# Patient Record
Sex: MALE | Race: WHITE | Employment: UNEMPLOYED | URBAN - METROPOLITAN AREA
[De-identification: names, ages, dates, MRNs, and addresses within clinical notes are randomized per-mention and may not be internally consistent; named-entity substitution may affect disease eponyms.]

---

## 2017-02-14 ENCOUNTER — OFFICE VISIT (OUTPATIENT)
Dept: FAMILY MEDICINE CLINIC | Age: 32
End: 2017-02-14

## 2017-02-14 VITALS
WEIGHT: 315 LBS | RESPIRATION RATE: 12 BRPM | TEMPERATURE: 98.2 F | DIASTOLIC BLOOD PRESSURE: 76 MMHG | SYSTOLIC BLOOD PRESSURE: 134 MMHG | BODY MASS INDEX: 44.1 KG/M2 | HEIGHT: 71 IN | HEART RATE: 85 BPM

## 2017-02-14 DIAGNOSIS — R53.82 CHRONIC FATIGUE: Primary | ICD-10-CM

## 2017-02-14 DIAGNOSIS — G47.10 EXCESSIVE SLEEPINESS: ICD-10-CM

## 2017-02-14 DIAGNOSIS — F33.41 RECURRENT MAJOR DEPRESSIVE DISORDER, IN PARTIAL REMISSION (HCC): ICD-10-CM

## 2017-02-14 DIAGNOSIS — Z23 NEED FOR PROPHYLACTIC VACCINATION AND INOCULATION AGAINST INFLUENZA: ICD-10-CM

## 2017-02-14 DIAGNOSIS — R79.89 LOW VITAMIN D LEVEL: ICD-10-CM

## 2017-02-14 DIAGNOSIS — F41.9 ANXIETY: ICD-10-CM

## 2017-02-14 PROCEDURE — 90688 IIV4 VACCINE SPLT 0.5 ML IM: CPT | Performed by: NURSE PRACTITIONER

## 2017-02-14 PROCEDURE — 90471 IMMUNIZATION ADMIN: CPT | Performed by: NURSE PRACTITIONER

## 2017-02-14 PROCEDURE — 99214 OFFICE O/P EST MOD 30 MIN: CPT | Performed by: NURSE PRACTITIONER

## 2017-02-14 RX ORDER — LAMOTRIGINE 200 MG/1
1 TABLET ORAL DAILY
COMMUNITY
Start: 2017-02-08 | End: 2017-11-24 | Stop reason: SDUPTHER

## 2017-02-14 ASSESSMENT — ENCOUNTER SYMPTOMS
ABDOMINAL PAIN: 0
PHOTOPHOBIA: 0
CONSTIPATION: 0
SORE THROAT: 0
ABDOMINAL DISTENTION: 0
BLOOD IN STOOL: 0
EYE PAIN: 0
DIARRHEA: 0
EYE DISCHARGE: 0
NAUSEA: 0
TROUBLE SWALLOWING: 0
COUGH: 0
SHORTNESS OF BREATH: 0
VOMITING: 0

## 2017-02-24 LAB
CHOLESTEROL, TOTAL: 210 MG/DL
CHOLESTEROL/HDL RATIO: NORMAL
HDLC SERPL-MCNC: 37 MG/DL (ref 35–70)
LDL CHOLESTEROL CALCULATED: 149 MG/DL (ref 0–160)
TRIGL SERPL-MCNC: 119 MG/DL
VLDLC SERPL CALC-MCNC: 24 MG/DL

## 2017-03-16 ENCOUNTER — TELEPHONE (OUTPATIENT)
Dept: FAMILY MEDICINE CLINIC | Age: 32
End: 2017-03-16

## 2017-03-17 ENCOUNTER — OFFICE VISIT (OUTPATIENT)
Dept: FAMILY MEDICINE CLINIC | Age: 32
End: 2017-03-17

## 2017-03-17 VITALS
HEART RATE: 81 BPM | RESPIRATION RATE: 12 BRPM | HEIGHT: 71 IN | BODY MASS INDEX: 44.1 KG/M2 | TEMPERATURE: 97.5 F | DIASTOLIC BLOOD PRESSURE: 81 MMHG | SYSTOLIC BLOOD PRESSURE: 142 MMHG | WEIGHT: 315 LBS

## 2017-03-17 DIAGNOSIS — E55.9 VITAMIN D DEFICIENCY: ICD-10-CM

## 2017-03-17 DIAGNOSIS — E78.2 MIXED HYPERLIPIDEMIA: Primary | ICD-10-CM

## 2017-03-17 DIAGNOSIS — E78.6 LOW HDL (UNDER 40): ICD-10-CM

## 2017-03-17 PROCEDURE — 99214 OFFICE O/P EST MOD 30 MIN: CPT | Performed by: NURSE PRACTITIONER

## 2017-03-17 RX ORDER — OMEGA-3S/DHA/EPA/FISH OIL/D3 300MG-1000
1 CAPSULE ORAL
Qty: 90 CAPSULE | Refills: 5 | Status: SHIPPED | OUTPATIENT
Start: 2017-03-17 | End: 2017-07-28 | Stop reason: SDUPTHER

## 2017-03-17 RX ORDER — CHOLECALCIFEROL (VITAMIN D3) 50 MCG
2000 TABLET ORAL DAILY
Qty: 30 TABLET | Refills: 5 | Status: SHIPPED | OUTPATIENT
Start: 2017-03-17 | End: 2017-07-28 | Stop reason: SDUPTHER

## 2017-04-17 PROBLEM — E66.01 MORBID OBESITY DUE TO EXCESS CALORIES (HCC): Status: ACTIVE | Noted: 2017-04-17

## 2017-07-28 ENCOUNTER — OFFICE VISIT (OUTPATIENT)
Dept: FAMILY MEDICINE CLINIC | Age: 32
End: 2017-07-28
Payer: COMMERCIAL

## 2017-07-28 VITALS
RESPIRATION RATE: 14 BRPM | WEIGHT: 315 LBS | DIASTOLIC BLOOD PRESSURE: 80 MMHG | HEART RATE: 100 BPM | BODY MASS INDEX: 42.66 KG/M2 | HEIGHT: 72 IN | TEMPERATURE: 99 F | SYSTOLIC BLOOD PRESSURE: 136 MMHG

## 2017-07-28 DIAGNOSIS — R53.83 OTHER FATIGUE: Primary | ICD-10-CM

## 2017-07-28 DIAGNOSIS — E55.9 VITAMIN D DEFICIENCY: ICD-10-CM

## 2017-07-28 DIAGNOSIS — R40.0 DAYTIME SLEEPINESS: ICD-10-CM

## 2017-07-28 DIAGNOSIS — E66.01 OBESITY, MORBID, BMI 50 OR HIGHER (HCC): ICD-10-CM

## 2017-07-28 DIAGNOSIS — R06.83 SNORING: ICD-10-CM

## 2017-07-28 DIAGNOSIS — E78.6 LOW HDL (UNDER 40): ICD-10-CM

## 2017-07-28 PROCEDURE — 99214 OFFICE O/P EST MOD 30 MIN: CPT | Performed by: FAMILY MEDICINE

## 2017-07-28 RX ORDER — CHOLECALCIFEROL (VITAMIN D3) 50 MCG
4000 TABLET ORAL DAILY
Qty: 60 TABLET | Refills: 5 | Status: SHIPPED | OUTPATIENT
Start: 2017-07-28 | End: 2021-02-08 | Stop reason: ALTCHOICE

## 2017-07-28 RX ORDER — OMEGA-3S/DHA/EPA/FISH OIL/D3 300MG-1000
2 CAPSULE ORAL 2 TIMES DAILY
Qty: 120 CAPSULE | Refills: 5 | Status: SHIPPED | OUTPATIENT
Start: 2017-07-28 | End: 2021-02-08 | Stop reason: ALTCHOICE

## 2017-07-28 ASSESSMENT — PATIENT HEALTH QUESTIONNAIRE - PHQ9
SUM OF ALL RESPONSES TO PHQ QUESTIONS 1-9: 0
2. FEELING DOWN, DEPRESSED OR HOPELESS: 0
1. LITTLE INTEREST OR PLEASURE IN DOING THINGS: 0
SUM OF ALL RESPONSES TO PHQ9 QUESTIONS 1 & 2: 0

## 2017-09-05 ENCOUNTER — INITIAL CONSULT (OUTPATIENT)
Dept: PULMONOLOGY | Age: 32
End: 2017-09-05
Payer: COMMERCIAL

## 2017-09-05 VITALS
BODY MASS INDEX: 44.1 KG/M2 | SYSTOLIC BLOOD PRESSURE: 128 MMHG | HEIGHT: 71 IN | WEIGHT: 315 LBS | DIASTOLIC BLOOD PRESSURE: 72 MMHG | HEART RATE: 90 BPM | OXYGEN SATURATION: 98 %

## 2017-09-05 DIAGNOSIS — R06.83 SNORING: ICD-10-CM

## 2017-09-05 DIAGNOSIS — R06.89 SLEEP RELATED CHOKING SENSATION: ICD-10-CM

## 2017-09-05 DIAGNOSIS — G47.10 HYPERSOMNIA: Primary | ICD-10-CM

## 2017-09-05 DIAGNOSIS — F31.81 BIPOLAR 2 DISORDER (HCC): ICD-10-CM

## 2017-09-05 DIAGNOSIS — E66.01 MORBID OBESITY WITH BMI OF 50.0-59.9, ADULT (HCC): ICD-10-CM

## 2017-09-05 PROBLEM — R53.83 FATIGUE: Status: ACTIVE | Noted: 2017-09-05

## 2017-09-05 PROBLEM — R40.0 DAYTIME SLEEPINESS: Status: ACTIVE | Noted: 2017-09-05

## 2017-09-05 PROCEDURE — 99203 OFFICE O/P NEW LOW 30 MIN: CPT | Performed by: INTERNAL MEDICINE

## 2017-09-12 ENCOUNTER — HOSPITAL ENCOUNTER (OUTPATIENT)
Dept: SLEEP CENTER | Age: 32
Discharge: HOME | End: 2017-09-12
Payer: COMMERCIAL

## 2017-09-12 DIAGNOSIS — F31.81 BIPOLAR 2 DISORDER (HCC): ICD-10-CM

## 2017-09-12 DIAGNOSIS — R06.83 SNORING: ICD-10-CM

## 2017-09-12 DIAGNOSIS — R06.89 SLEEP RELATED CHOKING SENSATION: ICD-10-CM

## 2017-09-12 DIAGNOSIS — G47.10 HYPERSOMNIA: ICD-10-CM

## 2017-09-12 DIAGNOSIS — E66.01 MORBID OBESITY WITH BMI OF 50.0-59.9, ADULT (HCC): ICD-10-CM

## 2017-09-12 PROCEDURE — 95810 POLYSOM 6/> YRS 4/> PARAM: CPT

## 2017-09-13 LAB — STATUS: NORMAL

## 2017-09-15 DIAGNOSIS — G47.33 OSA (OBSTRUCTIVE SLEEP APNEA): Primary | ICD-10-CM

## 2017-09-18 ENCOUNTER — TELEPHONE (OUTPATIENT)
Dept: SLEEP CENTER | Age: 32
End: 2017-09-18

## 2017-10-27 ENCOUNTER — TELEPHONE (OUTPATIENT)
Dept: SLEEP CENTER | Age: 32
End: 2017-10-27

## 2017-10-27 ENCOUNTER — OFFICE VISIT (OUTPATIENT)
Dept: FAMILY MEDICINE CLINIC | Age: 32
End: 2017-10-27
Payer: COMMERCIAL

## 2017-10-27 VITALS
TEMPERATURE: 99.6 F | SYSTOLIC BLOOD PRESSURE: 124 MMHG | RESPIRATION RATE: 12 BRPM | HEART RATE: 95 BPM | WEIGHT: 315 LBS | OXYGEN SATURATION: 98 % | HEIGHT: 71 IN | DIASTOLIC BLOOD PRESSURE: 88 MMHG | BODY MASS INDEX: 44.1 KG/M2

## 2017-10-27 DIAGNOSIS — E78.00 HYPERCHOLESTEROLEMIA: Primary | ICD-10-CM

## 2017-10-27 DIAGNOSIS — G47.33 OSA (OBSTRUCTIVE SLEEP APNEA): ICD-10-CM

## 2017-10-27 DIAGNOSIS — E55.9 VITAMIN D DEFICIENCY: ICD-10-CM

## 2017-10-27 DIAGNOSIS — E78.6 LOW HDL (UNDER 40): ICD-10-CM

## 2017-10-27 DIAGNOSIS — Z23 NEED FOR PROPHYLACTIC VACCINATION AND INOCULATION AGAINST INFLUENZA: ICD-10-CM

## 2017-10-27 PROCEDURE — 90471 IMMUNIZATION ADMIN: CPT | Performed by: FAMILY MEDICINE

## 2017-10-27 PROCEDURE — 99214 OFFICE O/P EST MOD 30 MIN: CPT | Performed by: FAMILY MEDICINE

## 2017-10-27 PROCEDURE — 1036F TOBACCO NON-USER: CPT | Performed by: FAMILY MEDICINE

## 2017-10-27 PROCEDURE — G8427 DOCREV CUR MEDS BY ELIG CLIN: HCPCS | Performed by: FAMILY MEDICINE

## 2017-10-27 PROCEDURE — G8417 CALC BMI ABV UP PARAM F/U: HCPCS | Performed by: FAMILY MEDICINE

## 2017-10-27 PROCEDURE — G8484 FLU IMMUNIZE NO ADMIN: HCPCS | Performed by: FAMILY MEDICINE

## 2017-10-27 PROCEDURE — 90688 IIV4 VACCINE SPLT 0.5 ML IM: CPT | Performed by: FAMILY MEDICINE

## 2017-10-27 NOTE — PROGRESS NOTES
[address]  [facility]  [facility]  [address] [address]  Dept: [phone]  Dept Fax: [phone]  Loc: [phone]    Chief Complaint   Patient presents with    3 Month Follow-Up    Hyperlipidemia       HPI:    Radha Bills is a 33 y.o. male who comes today to the office for follow-up of hyperlipidemia and vitamin D deficiency. Hyperlipidemia:     He was recommended to take fish oil, loose weight and eat healthier. He is takingt is taking fish oil OTC. Last labs showed Total cholesterol of 210, HDL 37, ,  Triglycerides 119. There is not a family history of hyperlipidemia. There is a family history of early ischemia heart disease. He is not adherent to low cholesterol diet. He is no exercising regularly. Heis going to start now that the weather is cooler. Vitamin D deficiency:  Diagnosed few months ago. His level was 12,2 in February 24, 2017. He is taking 4000 IU of vitamin D 3. Last visit she was complaining of  extreme fatigue, taking several naps a day after a good night of sleep, daytime sleepiness and memory changes. His mother used to tell him that he snores. [facility] and he did a sleep study that according to him was positive. He has an appointment for CPAP titration in October 30.       has a current medication list which includes the following prescription(s): vitamin d, fish oil burp-less, balanced b-100 complex cr, lamotrigine, and fluoxetine hcl.     No Known Allergies    Past Medical History:   Diagnosis Date    ADHD (attention deficit hyperactivity disorder) 57    Depression 2000    Obesity 1990's       Past Surgical History:   Procedure Laterality Date    ADENOIDECTOMY  1988    TONSILLECTOMY  81       Social History     Social History    Marital status: Single     Spouse name: N/A    Number of children: 0    Years of education: 15 Occupational History    Not on file. Social History Main Topics    Smoking status: Never Smoker    Smokeless tobacco: Never Used    Alcohol use No    Drug use: No    Sexual activity: Not Currently     Partners: Female     Other Topics Concern    Not on file     Social History Narrative    No narrative on file       Family History   Problem Relation Age of Onset    Arthritis Mother     Alcohol Abuse Father      hx of alcoholism    Arthritis Maternal Aunt     Stroke Maternal Grandmother [de-identified]    Stroke Paternal Grandmother     COPD Paternal Grandfather        Review of Systems:     General ROS: negative for - chills, fatigue, fever, weight gain or weight loss  Psychological ROS: negative for - anxiety or depression  Lymphatic ROS: no swollen lymph nodes  Thyroid ROS: no enlarged thyroid  Hematologic ROS: no easy bruising  Respiratory ROS: no cough, shortness of breath, or wheezing  Cardiovascular ROS: no chest pain or dyspnea on exertion  Gastrointestinal ROS: negative for - abdominal pain, constipation, diarrhea or nausea/vomiting  Endocrine ROS: no polyuria, polydipsia, or increased apetite  Musculoskeletal ROS: negative for - muscle pain  Neurological ROS: negative for - dizziness or headaches  Skin ROS: no rashes    Physical Examination:     Blood pressure 124/88, pulse 95, temperature 99.6 °F (37.6 °C), temperature source Oral, resp. rate 12, height 5' 10.98\" (1.803 m), weight (!) 407 lb (184.6 kg), SpO2 98 %.       General appearance - alert, well appearing, and in no distress  Mental status - normal mood, behavior, speech, dress, motor activity, and thought processes  HEENT - NC, AT, PERRLA, EOMI, Anicteric sclerae  Ears - normal tympanic membranes bilaterally  Nose - normal turbinates, no drainage  Throat - no erythema or lesions, moist mucosas  Neck - supple, no significant adenopathy  Chest - clear to auscultation, no wheezes, rales or rhonchi, symmetric air entry  Heart - normal rate, regular rhythm, normal S1, S2, no murmurs, rubs, clicks or gallops  Abdomen - soft, nontender, nondistended, no masses or organomegaly  Extremities - peripheral pulses normal, no pedal edema, no clubbing or cyanosis  Skin - normal coloration and turgor, no rashes, no suspicious skin lesions noted    Assessment:    1. Hypercholesterolemia  Lipid Panel   2. Vitamin D deficiency     3. Low HDL (under 40)     4. JARED (obstructive sleep apnea)     5. Adult BMI 50.0-59.9 kg/sq m ([address]     6. Need for prophylactic vaccination and inoculation against influenza  INFLUENZA, QUADV, 3 YRS AND OLDER, IM, MDV, 0.5ML (FLUZONE QUADV)       Plan:  Continue low fat diet  Increase physical activity to 20 minutes 5 x week as discussed last visit   Check Vitamin D level  Continue 4000 IU vitamin D  daily  Continue omega 3 fatty acids 2 caps PO BID      Orders Placed This Encounter   Procedures    INFLUENZA, QUADV, 3 YRS AND OLDER, IM, MDV, 0.5ML (FLUZONE QUADV)    Lipid Panel     Standing Status:   Future     Standing Expiration Date:   10/27/2018     Order Specific Question:   Is Patient Fasting?/# of Hours     Answer:   12 hours         Return in about 6 months (around 4/27/2018).     Moon Reyes MD

## 2017-10-27 NOTE — PATIENT INSTRUCTIONS
You may receive a survey about your visit with us today. The feedback from our patients helps us identify what is working well and where the service to all patients can be enhanced. Thank you! Patient Education        Influenza (Flu) Vaccine: Care Instructions  Your Care Instructions  Influenza (flu) is an infection in the lungs and breathing passages. It is caused by the influenza virus. There are different strains, or types, of the flu virus every year. The flu comes on quickly. It can cause a cough, stuffy nose, fever, chills, tiredness, and aches and pains. These symptoms may last up to 10 days. The flu can make you feel very sick, but most of the time it doesn't lead to other problems. But it can cause serious problems in people who are older or who have a long-term illness, such as heart disease or diabetes. You can help prevent the flu by getting a flu vaccine every year, as soon as it is available. You cannot get the flu from the vaccine. The vaccine prevents most cases of the flu. But even when the vaccine doesn't prevent the flu, it can make symptoms less severe and reduce the chance of problems from the flu. Sometimes, young children and people who have an immune system problem may have a slight fever or muscle aches or pains 6 to 12 hours after getting the shot. These symptoms usually last 1 or 2 days. Follow-up care is a key part of your treatment and safety. Be sure to make and go to all appointments, and call your doctor if you are having problems. It's also a good idea to know your test results and keep a list of the medicines you take. Who should get the flu vaccine? Everyone age 7 months or older should get a flu vaccine each year. It lowers the chance of getting and spreading the flu. The vaccine is very important for people who are at high risk for getting other health problems from the flu. This includes:  · Anyone 48years of age or older.   · People who live in a long-term care center,

## 2017-10-31 ENCOUNTER — HOSPITAL ENCOUNTER (OUTPATIENT)
Dept: SLEEP CENTER | Age: 32
Discharge: HOME | End: 2017-10-31
Payer: COMMERCIAL

## 2017-10-31 DIAGNOSIS — G47.33 OSA (OBSTRUCTIVE SLEEP APNEA): ICD-10-CM

## 2017-10-31 PROCEDURE — 95811 POLYSOM 6/>YRS CPAP 4/> PARM: CPT

## 2017-11-02 ENCOUNTER — TELEPHONE (OUTPATIENT)
Dept: SLEEP CENTER | Age: 32
End: 2017-11-02

## 2017-11-14 ENCOUNTER — TELEPHONE (OUTPATIENT)
Dept: SLEEP CENTER | Age: 32
End: 2017-11-14

## 2017-11-24 ENCOUNTER — OFFICE VISIT (OUTPATIENT)
Dept: PSYCHIATRY | Age: 32
End: 2017-11-24
Payer: COMMERCIAL

## 2017-11-24 DIAGNOSIS — F33.41 RECURRENT MAJOR DEPRESSIVE DISORDER, IN PARTIAL REMISSION (HCC): Primary | ICD-10-CM

## 2017-11-24 DIAGNOSIS — F41.1 GAD (GENERALIZED ANXIETY DISORDER): ICD-10-CM

## 2017-11-24 DIAGNOSIS — F90.0 ATTENTION DEFICIT HYPERACTIVITY DISORDER (ADHD), PREDOMINANTLY INATTENTIVE TYPE: ICD-10-CM

## 2017-11-24 PROCEDURE — 1036F TOBACCO NON-USER: CPT | Performed by: PHYSICIAN ASSISTANT

## 2017-11-24 PROCEDURE — G8484 FLU IMMUNIZE NO ADMIN: HCPCS | Performed by: PHYSICIAN ASSISTANT

## 2017-11-24 PROCEDURE — 99204 OFFICE O/P NEW MOD 45 MIN: CPT | Performed by: PHYSICIAN ASSISTANT

## 2017-11-24 PROCEDURE — G8417 CALC BMI ABV UP PARAM F/U: HCPCS | Performed by: PHYSICIAN ASSISTANT

## 2017-11-24 PROCEDURE — G8427 DOCREV CUR MEDS BY ELIG CLIN: HCPCS | Performed by: PHYSICIAN ASSISTANT

## 2017-11-24 RX ORDER — LAMOTRIGINE 200 MG/1
200 TABLET ORAL DAILY
Qty: 30 TABLET | Refills: 3 | Status: SHIPPED | OUTPATIENT
Start: 2017-11-24 | End: 2018-06-28 | Stop reason: SDUPTHER

## 2017-11-24 RX ORDER — FLUOXETINE HYDROCHLORIDE 20 MG/1
60 CAPSULE ORAL DAILY
Qty: 90 CAPSULE | Refills: 3 | Status: SHIPPED | OUTPATIENT
Start: 2017-11-24 | End: 2018-06-28 | Stop reason: SDUPTHER

## 2018-01-26 ENCOUNTER — OFFICE VISIT (OUTPATIENT)
Dept: PSYCHIATRY | Age: 33
End: 2018-01-26
Payer: COMMERCIAL

## 2018-01-26 DIAGNOSIS — F41.1 GAD (GENERALIZED ANXIETY DISORDER): ICD-10-CM

## 2018-01-26 DIAGNOSIS — F33.41 RECURRENT MAJOR DEPRESSIVE DISORDER, IN PARTIAL REMISSION (HCC): Primary | ICD-10-CM

## 2018-01-26 DIAGNOSIS — F90.0 ATTENTION DEFICIT HYPERACTIVITY DISORDER (ADHD), PREDOMINANTLY INATTENTIVE TYPE: ICD-10-CM

## 2018-01-26 PROCEDURE — G8427 DOCREV CUR MEDS BY ELIG CLIN: HCPCS | Performed by: PHYSICIAN ASSISTANT

## 2018-01-26 PROCEDURE — G8417 CALC BMI ABV UP PARAM F/U: HCPCS | Performed by: PHYSICIAN ASSISTANT

## 2018-01-26 PROCEDURE — 1036F TOBACCO NON-USER: CPT | Performed by: PHYSICIAN ASSISTANT

## 2018-01-26 PROCEDURE — G8484 FLU IMMUNIZE NO ADMIN: HCPCS | Performed by: PHYSICIAN ASSISTANT

## 2018-01-26 PROCEDURE — 99213 OFFICE O/P EST LOW 20 MIN: CPT | Performed by: PHYSICIAN ASSISTANT

## 2018-04-27 ENCOUNTER — OFFICE VISIT (OUTPATIENT)
Dept: PSYCHIATRY | Age: 33
End: 2018-04-27
Payer: COMMERCIAL

## 2018-04-27 DIAGNOSIS — F41.1 GAD (GENERALIZED ANXIETY DISORDER): ICD-10-CM

## 2018-04-27 DIAGNOSIS — F33.41 RECURRENT MAJOR DEPRESSIVE DISORDER, IN PARTIAL REMISSION (HCC): Primary | ICD-10-CM

## 2018-04-27 PROCEDURE — 1036F TOBACCO NON-USER: CPT | Performed by: PHYSICIAN ASSISTANT

## 2018-04-27 PROCEDURE — G8428 CUR MEDS NOT DOCUMENT: HCPCS | Performed by: PHYSICIAN ASSISTANT

## 2018-04-27 PROCEDURE — 99213 OFFICE O/P EST LOW 20 MIN: CPT | Performed by: PHYSICIAN ASSISTANT

## 2018-04-27 PROCEDURE — G8417 CALC BMI ABV UP PARAM F/U: HCPCS | Performed by: PHYSICIAN ASSISTANT

## 2018-06-08 ENCOUNTER — OFFICE VISIT (OUTPATIENT)
Dept: PSYCHOLOGY | Age: 33
End: 2018-06-08
Payer: COMMERCIAL

## 2018-06-08 DIAGNOSIS — F33.2 MAJOR DEPRESSIVE DISORDER, RECURRENT EPISODE, SEVERE WITH ANXIOUS DISTRESS (HCC): Primary | ICD-10-CM

## 2018-06-08 PROCEDURE — 90791 PSYCH DIAGNOSTIC EVALUATION: CPT | Performed by: PSYCHOLOGIST

## 2018-06-08 ASSESSMENT — PATIENT HEALTH QUESTIONNAIRE - PHQ9
SUM OF ALL RESPONSES TO PHQ QUESTIONS 1-9: 18
SUM OF ALL RESPONSES TO PHQ9 QUESTIONS 1 & 2: 5
4. FEELING TIRED OR HAVING LITTLE ENERGY: 3
6. FEELING BAD ABOUT YOURSELF - OR THAT YOU ARE A FAILURE OR HAVE LET YOURSELF OR YOUR FAMILY DOWN: 3
1. LITTLE INTEREST OR PLEASURE IN DOING THINGS: 3
2. FEELING DOWN, DEPRESSED OR HOPELESS: 2
7. TROUBLE CONCENTRATING ON THINGS, SUCH AS READING THE NEWSPAPER OR WATCHING TELEVISION: 1
10. IF YOU CHECKED OFF ANY PROBLEMS, HOW DIFFICULT HAVE THESE PROBLEMS MADE IT FOR YOU TO DO YOUR WORK, TAKE CARE OF THINGS AT HOME, OR GET ALONG WITH OTHER PEOPLE: 3
5. POOR APPETITE OR OVEREATING: 2
9. THOUGHTS THAT YOU WOULD BE BETTER OFF DEAD, OR OF HURTING YOURSELF: 1
3. TROUBLE FALLING OR STAYING ASLEEP: 3
8. MOVING OR SPEAKING SO SLOWLY THAT OTHER PEOPLE COULD HAVE NOTICED. OR THE OPPOSITE, BEING SO FIGETY OR RESTLESS THAT YOU HAVE BEEN MOVING AROUND A LOT MORE THAN USUAL: 0

## 2018-06-28 RX ORDER — FLUOXETINE HYDROCHLORIDE 20 MG/1
60 CAPSULE ORAL DAILY
Qty: 90 CAPSULE | Refills: 3 | Status: SHIPPED | OUTPATIENT
Start: 2018-06-28 | End: 2018-09-17 | Stop reason: ALTCHOICE

## 2018-06-28 RX ORDER — LAMOTRIGINE 200 MG/1
200 TABLET ORAL DAILY
Qty: 30 TABLET | Refills: 3 | Status: SHIPPED | OUTPATIENT
Start: 2018-06-28 | End: 2018-10-29 | Stop reason: SDUPTHER

## 2018-06-29 ENCOUNTER — OFFICE VISIT (OUTPATIENT)
Dept: PSYCHOLOGY | Age: 33
End: 2018-06-29
Payer: COMMERCIAL

## 2018-06-29 DIAGNOSIS — F33.2 MAJOR DEPRESSIVE DISORDER, RECURRENT EPISODE, SEVERE WITH ANXIOUS DISTRESS (HCC): Primary | ICD-10-CM

## 2018-06-29 PROCEDURE — 90834 PSYTX W PT 45 MINUTES: CPT | Performed by: PSYCHOLOGIST

## 2018-07-12 ENCOUNTER — TELEPHONE (OUTPATIENT)
Dept: PSYCHOLOGY | Age: 33
End: 2018-07-12

## 2018-07-13 NOTE — TELEPHONE ENCOUNTER
Shalini Hendrix (OK per HIPAA) was called and provider will write letter and fax to desired location.

## 2018-07-23 ENCOUNTER — OFFICE VISIT (OUTPATIENT)
Dept: PSYCHIATRY | Age: 33
End: 2018-07-23
Payer: COMMERCIAL

## 2018-07-23 ENCOUNTER — TELEPHONE (OUTPATIENT)
Dept: PSYCHIATRY | Age: 33
End: 2018-07-23

## 2018-07-23 DIAGNOSIS — F90.0 ATTENTION DEFICIT HYPERACTIVITY DISORDER (ADHD), PREDOMINANTLY INATTENTIVE TYPE: ICD-10-CM

## 2018-07-23 DIAGNOSIS — F33.41 RECURRENT MAJOR DEPRESSIVE DISORDER, IN PARTIAL REMISSION (HCC): Primary | ICD-10-CM

## 2018-07-23 DIAGNOSIS — F41.1 GAD (GENERALIZED ANXIETY DISORDER): ICD-10-CM

## 2018-07-23 PROCEDURE — G8427 DOCREV CUR MEDS BY ELIG CLIN: HCPCS | Performed by: PHYSICIAN ASSISTANT

## 2018-07-23 PROCEDURE — G8417 CALC BMI ABV UP PARAM F/U: HCPCS | Performed by: PHYSICIAN ASSISTANT

## 2018-07-23 PROCEDURE — 1036F TOBACCO NON-USER: CPT | Performed by: PHYSICIAN ASSISTANT

## 2018-07-23 PROCEDURE — 99213 OFFICE O/P EST LOW 20 MIN: CPT | Performed by: PHYSICIAN ASSISTANT

## 2018-07-23 NOTE — PROGRESS NOTES
[facility]  Progress note          SUBJECTIVE:     Stable  Worried about issues with SSD, hearing in September  Easily angered, h/o ADHD tx, doesn't think he wants any now  Sleeping and eating well    Was able to establish with Dr. Gustavo Castellon         Current Meds    Current Outpatient Prescriptions:     FLUoxetine (PROZAC) 20 MG capsule, Take 3 capsules by mouth daily, Disp: 90 capsule, Rfl: 3    lamoTRIgine (LAMICTAL) 200 MG tablet, Take 1 tablet by mouth daily, Disp: 30 tablet, Rfl: 3    Cholecalciferol (VITAMIN D) 2000 units TABS tablet, Take 2 tablets by mouth daily, Disp: 60 tablet, Rfl: 5    Omega-3 Fatty Acids (FISH OIL BURP-LESS) 1200 MG CAPS, Take 2 tablets by mouth 2 times daily, Disp: 120 capsule, Rfl: 5    Vitamins-Lipotropics (BALANCED B-100 COMPLEX CR) TBCR, Take 1 tablet by mouth 4 times daily (after meals and at bedtime), Disp: 120 tablet, Rfl: 5         Mental Status Exam  Level of consciousness:  Within normal limits  Appearance: Street clothes, obese, seated on couch  Behavior/Motor: within normal limits   Attitude toward examiner:  Cooperative, attentive, good eye contact  Speech:  Spontaneous, normal rate and volume  Mood:  euthymic  Affect:   mood congruent  Thought processes:  linear, goal directed and coherent  Thought content:  Denies SI/HI, AVH, delusions  Cognition:  In tact  Memory: age appropriate  Insight and judgement: fair  Medication side effects:  denies      DSM-5 DIAGNOSIS:    WATSON  MDD  ADD    JARED      PLAN    Refer to Dr. Gustavo Castellon for continued psychotherapy   Encouraged compliance with CPAP  No changes in meds  Follow up 3 months, sooner PRN, call with questions       Electronically signed by Barber Rhodes PA-C on 7/23/2018 at 1:17 PM

## 2018-07-30 ENCOUNTER — OFFICE VISIT (OUTPATIENT)
Dept: PSYCHOLOGY | Age: 33
End: 2018-07-30
Payer: COMMERCIAL

## 2018-07-30 DIAGNOSIS — F33.2 MAJOR DEPRESSIVE DISORDER, RECURRENT EPISODE, SEVERE WITH ANXIOUS DISTRESS (HCC): Primary | ICD-10-CM

## 2018-07-30 PROCEDURE — 90837 PSYTX W PT 60 MINUTES: CPT | Performed by: PSYCHOLOGIST

## 2018-07-30 NOTE — PROGRESS NOTES
Psychotherapy Note  Sherley Elias. Cecilio Neff Psy.D. Visit Date:  7/30/2018    Patient:  Maryuri Salcedo  YOB: 1985  Chief Complaint:  Depression and Follow-up    Duration of session:  60 minutes      S:     Pt said the disability hearing is scheduled for late September. He said he didn't ride the bike, and didn't do any journaling. He said he never has the energy to do much of anything. He admitted that he \"can't hardly focus on anything\" until this hearing is over. Pt took self-compassion questionnaire, and said he really doesn't think highly of himself at all, he can't identify any strengths when asked, and said he constantly focuses on ways he is a failure. He said he will feel a great sense of relief if he can get disability, like a weight is off his shoulders. If he doesn't get it he said he will feel like a failure once again, not sure of what he's supposed to do with his life. Regardless of disability, we discussed the prospect of working part time, just as a means for having something to do, to have some purpose in his life. He didn't seem warm to the idea. O:    Appearance    Patient presents as alert, oriented, and cooperative with moderate distress  Appetite abnormal: overeating  Sleep disturbance Yes  Loss of pleasure Yes  Speech    normal rate, normal volume, well articulated and clear and understandable  Mood    Depressed  Low self-esteem  Emptiness  [place]  Affect    depressed affect  Thought Process    linear, goal directed, coherent and linear and coherent  Insight    Fair  Judgment    Intact  Memory    recent and remote memory intact  Suicide Assessment    Pt reported intermittent suicidal ideation with no plans or intent; he was able to contract for safety    A:    1. Major depressive disorder, recurrent episode, severe with anxious distress ([facility] [address]        Continued psychotherapy is needed to address depression and anxiety symptoms.   I think Booker Peterson will be

## 2018-07-30 NOTE — PATIENT INSTRUCTIONS
1.  May you be kind to yourself. 2.  May you see the good in yourself. 3.  May you focus on what you can do today to take one step toward better health, not worrying about yesterday, tomorrow, or where you should be. 4.  Return to see Dr. Drew Browne in 4 weeks.

## 2018-08-09 NOTE — TELEPHONE ENCOUNTER
SPOKE WITH ROSEMARY MOTHER; SHE STATES THAT SHE WILL BE IN SOMETIME WITH THIS WEEK WITH [facility] TO GET THE GENETIC TESTING.

## 2018-08-31 ENCOUNTER — OFFICE VISIT (OUTPATIENT)
Dept: PSYCHOLOGY | Age: 33
End: 2018-08-31
Payer: COMMERCIAL

## 2018-08-31 DIAGNOSIS — F33.2 MAJOR DEPRESSIVE DISORDER, RECURRENT EPISODE, SEVERE WITH ANXIOUS DISTRESS (HCC): Primary | ICD-10-CM

## 2018-08-31 PROCEDURE — 90834 PSYTX W PT 45 MINUTES: CPT | Performed by: PSYCHOLOGIST

## 2018-08-31 NOTE — PATIENT INSTRUCTIONS
1.  Call your psychiatry office and get a sooner appointment. 2.  Remember to look for the positive aspects in your life. What you look for, you will often find! 3. Return to see Dr. Lokesh Moraes in 4 weeks.

## 2018-08-31 NOTE — PROGRESS NOTES
Psychotherapy Note  Natacha Grajeda. Madison Nguyen Psy.D. Visit Date:  2018    Patient:  Flor Covarrubias  YOB: 1985  Chief Complaint:  Follow-up and Depression    Duration of session:  45 minutes      S:     Pt said his aunt  recently and this has been tough mostly on his mom. He cried about it for a while, but said he feels like he has mostly grieved well. He was also annoyed about some minor stressors, one of which is that he hasn't been able to use his computer. We talked about how pt seems to be \"putting his eggs all in one basket\" hoping for the disability. Pt said it was the only way he could hope to be happy was to get disability so he could buy some things and set goals he would want to achieve if he had money. We discussed the possibility of pt working a part-time job and pt only had negative things to say about it why it wouldn't work. We talked about being optimistic and looking for positive things in life as opposed to what he usually does which is looking for and waiting for negative things to happen to him. He said he would give this a try. We also discussed what things would be like if pt were denied disability and what he could do from there. O:    Appearance    Patient presents as alert, oriented, and cooperative with moderate distress  Appetite abnormal: overeating  Sleep disturbance Yes  Loss of pleasure Yes  Speech    normal rate, normal volume, well articulated and clear and understandable  Mood    Depressed  Low self-esteem  Emptiness  [place]  Affect    depressed affect  Thought Process    linear, goal directed, coherent and linear and coherent  Insight    Fair  Judgment    Intact  Memory    recent and remote memory intact  Suicide Assessment    Pt reported intermittent suicidal ideation with no plans or intent; he was able to contract for safety    A:    1.  Major depressive disorder, recurrent episode, severe with anxious distress ([address] Continued psychotherapy is needed to address depression and anxiety symptoms.  [employer] Testing results are in file and should be interpreted by his psychiatrist.  He may benefit from a different anti-depressant to treat his depression.       P:    1. Call your psychiatry office and get a sooner appointment. 2.  Remember to look for the positive aspects in your life. What you look for, you will often find! 3. Return to see Dr. Hernandez Lyons in 4 weeks. All questions about treatment plan answered. Patient instructed to go immediately to the emergency room and/or call 911 if any suicidal or homicidal ideations. Patient stated understanding and is agreeable to treatment and crisis plan. Provider Signature:  Electronically signed by SHAKILA Mccarthy on 8/31/2018 at 2:01 PM

## 2018-09-17 ENCOUNTER — OFFICE VISIT (OUTPATIENT)
Dept: PSYCHIATRY | Age: 33
End: 2018-09-17
Payer: COMMERCIAL

## 2018-09-17 DIAGNOSIS — F41.1 GAD (GENERALIZED ANXIETY DISORDER): ICD-10-CM

## 2018-09-17 DIAGNOSIS — F33.41 RECURRENT MAJOR DEPRESSIVE DISORDER, IN PARTIAL REMISSION (HCC): Primary | ICD-10-CM

## 2018-09-17 PROCEDURE — 1036F TOBACCO NON-USER: CPT | Performed by: PHYSICIAN ASSISTANT

## 2018-09-17 PROCEDURE — G8417 CALC BMI ABV UP PARAM F/U: HCPCS | Performed by: PHYSICIAN ASSISTANT

## 2018-09-17 PROCEDURE — G8427 DOCREV CUR MEDS BY ELIG CLIN: HCPCS | Performed by: PHYSICIAN ASSISTANT

## 2018-09-17 PROCEDURE — 99213 OFFICE O/P EST LOW 20 MIN: CPT | Performed by: PHYSICIAN ASSISTANT

## 2018-09-17 RX ORDER — VILAZODONE HYDROCHLORIDE 40 MG/1
TABLET ORAL
Qty: 30 TABLET | Refills: 0 | Status: SHIPPED | OUTPATIENT
Start: 2018-09-17 | End: 2018-10-22 | Stop reason: SDUPTHER

## 2018-09-17 NOTE — PROGRESS NOTES
[facility]  Progress note        Chief Complaint   Patient presents with    Results    Depression      SUBJECTIVE:     Depression increased last few months   Less motivated, more ahnedonic. Anxiety increasing.    SSD for psych appt this Wednesday  H/o many past med failures:      Past psych meds:   Wellbutrin, Prozac, Paxil, Celexa, Effexor, Lexapro, Zoloft  Abilify    Genetic test results discussed with patient, who then brought his mother back, test results discussed with her then as well    Current Meds    Current Outpatient Prescriptions:     FLUoxetine (PROZAC) 20 MG capsule, Take 3 capsules by mouth daily, Disp: 90 capsule, Rfl: 3    lamoTRIgine (LAMICTAL) 200 MG tablet, Take 1 tablet by mouth daily, Disp: 30 tablet, Rfl: 3    Cholecalciferol (VITAMIN D) 2000 units TABS tablet, Take 2 tablets by mouth daily, Disp: 60 tablet, Rfl: 5    Omega-3 Fatty Acids (FISH OIL BURP-LESS) 1200 MG CAPS, Take 2 tablets by mouth 2 times daily, Disp: 120 capsule, Rfl: 5    Vitamins-Lipotropics (BALANCED B-100 COMPLEX CR) TBCR, Take 1 tablet by mouth 4 times daily (after meals and at bedtime), Disp: 120 tablet, Rfl: 5         Mental Status Exam  Level of consciousness:  Within normal limits  Appearance: Street clothes, obese, seated on couch  Behavior/Motor: within normal limits   Attitude toward examiner:  Cooperative, attentive, good eye contact  Speech:  Spontaneous, normal rate and volume  Mood:  depressed  Affect:   mood congruent  Thought processes:  linear, goal directed and coherent  Thought content:  Denies SI/HI, AVH, delusions  Cognition:  In tact  Memory: age appropriate  Insight and judgement: fair  Medication side effects:  denies      DSM-5 DIAGNOSIS:    WATSON  MDD  ADD    JARED      PLAN    Cross/taper viibryd and Prozac  Follow up 4 weeks, sooner PRN, call with questions       Electronically signed by Pauline Cummins PA-C on 9/17/2018 at 1:32 PM time spent 33 minutes  I have discussed with the patient risks, benefits, side effects, and alternatives (and relevant risks, benefits, and side effects related to alternatives or not receiving care), and likelihood of the success. Patient instructed to seek emergency help via ED or calling 911 should symptoms become severe, or if thoughts to harm self or others develop. Patient stated clear understanding and is agreeable to treatment plan.    Med list discussed and written out for patient including taper instructions

## 2018-10-17 ENCOUNTER — OFFICE VISIT (OUTPATIENT)
Dept: PSYCHOLOGY | Age: 33
End: 2018-10-17
Payer: COMMERCIAL

## 2018-10-17 DIAGNOSIS — F33.2 MAJOR DEPRESSIVE DISORDER, RECURRENT EPISODE, SEVERE WITH ANXIOUS DISTRESS (HCC): Primary | ICD-10-CM

## 2018-10-17 PROCEDURE — 90832 PSYTX W PT 30 MINUTES: CPT | Performed by: PSYCHOLOGIST

## 2018-10-22 RX ORDER — VILAZODONE HYDROCHLORIDE 40 MG/1
TABLET ORAL
Qty: 30 TABLET | Refills: 0 | Status: SHIPPED | OUTPATIENT
Start: 2018-10-22 | End: 2018-10-29 | Stop reason: SDUPTHER

## 2018-10-29 ENCOUNTER — OFFICE VISIT (OUTPATIENT)
Dept: PSYCHIATRY | Age: 33
End: 2018-10-29
Payer: COMMERCIAL

## 2018-10-29 DIAGNOSIS — F33.1 MODERATE EPISODE OF RECURRENT MAJOR DEPRESSIVE DISORDER (HCC): Primary | ICD-10-CM

## 2018-10-29 DIAGNOSIS — F41.1 GAD (GENERALIZED ANXIETY DISORDER): ICD-10-CM

## 2018-10-29 PROCEDURE — 1036F TOBACCO NON-USER: CPT | Performed by: PHYSICIAN ASSISTANT

## 2018-10-29 PROCEDURE — G8427 DOCREV CUR MEDS BY ELIG CLIN: HCPCS | Performed by: PHYSICIAN ASSISTANT

## 2018-10-29 PROCEDURE — 99213 OFFICE O/P EST LOW 20 MIN: CPT | Performed by: PHYSICIAN ASSISTANT

## 2018-10-29 PROCEDURE — G8421 BMI NOT CALCULATED: HCPCS | Performed by: PHYSICIAN ASSISTANT

## 2018-10-29 PROCEDURE — G8484 FLU IMMUNIZE NO ADMIN: HCPCS | Performed by: PHYSICIAN ASSISTANT

## 2018-10-29 RX ORDER — QUETIAPINE FUMARATE 100 MG/1
TABLET, FILM COATED ORAL
Qty: 30 TABLET | Refills: 5 | Status: SHIPPED | OUTPATIENT
Start: 2018-10-29 | End: 2019-07-25

## 2018-10-29 RX ORDER — VILAZODONE HYDROCHLORIDE 40 MG/1
TABLET ORAL
Qty: 30 TABLET | Refills: 6 | Status: SHIPPED | OUTPATIENT
Start: 2018-10-29 | End: 2019-05-30 | Stop reason: SDUPTHER

## 2018-10-29 RX ORDER — LAMOTRIGINE 200 MG/1
200 TABLET ORAL DAILY
Qty: 30 TABLET | Refills: 6 | Status: SHIPPED | OUTPATIENT
Start: 2018-10-29 | End: 2019-05-30 | Stop reason: SDUPTHER

## 2018-10-29 NOTE — PROGRESS NOTES
goal directed and coherent  Thought content:  Denies SI/HI, AVH, delusions  Cognition:  In tact  Memory: age appropriate  Insight and judgement: fair  Medication side effects:  denies      DSM-5 DIAGNOSIS:    WATSON  MDD  ADD    JARED    PLAN    Seroquel 50mg QHS, increase as tolerated to 100mg/d. Follow up 6 weeks, sooner PRN, call with questions       Electronically signed by Troy Arcos PA-C on 10/29/2018 at 1:12 PM time spent 33 minutes  I have discussed with the patient risks, benefits, side effects, and alternatives (and relevant risks, benefits, and side effects related to alternatives or not receiving care), and likelihood of the success. Patient instructed to seek emergency help via ED or calling 911 should symptoms become severe, or if thoughts to harm self or others develop. Patient stated clear understanding and is agreeable to treatment plan.

## 2018-11-30 ENCOUNTER — OFFICE VISIT (OUTPATIENT)
Dept: PSYCHOLOGY | Age: 33
End: 2018-11-30
Payer: COMMERCIAL

## 2018-11-30 DIAGNOSIS — F33.2 MAJOR DEPRESSIVE DISORDER, RECURRENT EPISODE, SEVERE WITH ANXIOUS DISTRESS (HCC): Primary | ICD-10-CM

## 2018-11-30 PROCEDURE — 90832 PSYTX W PT 30 MINUTES: CPT | Performed by: PSYCHOLOGIST

## 2018-11-30 ASSESSMENT — PATIENT HEALTH QUESTIONNAIRE - PHQ9
2. FEELING DOWN, DEPRESSED OR HOPELESS: 1
5. POOR APPETITE OR OVEREATING: 0
10. IF YOU CHECKED OFF ANY PROBLEMS, HOW DIFFICULT HAVE THESE PROBLEMS MADE IT FOR YOU TO DO YOUR WORK, TAKE CARE OF THINGS AT HOME, OR GET ALONG WITH OTHER PEOPLE: 1
9. THOUGHTS THAT YOU WOULD BE BETTER OFF DEAD, OR OF HURTING YOURSELF: 1
4. FEELING TIRED OR HAVING LITTLE ENERGY: 3
SUM OF ALL RESPONSES TO PHQ9 QUESTIONS 1 & 2: 2
SUM OF ALL RESPONSES TO PHQ QUESTIONS 1-9: 8
8. MOVING OR SPEAKING SO SLOWLY THAT OTHER PEOPLE COULD HAVE NOTICED. OR THE OPPOSITE, BEING SO FIGETY OR RESTLESS THAT YOU HAVE BEEN MOVING AROUND A LOT MORE THAN USUAL: 1
1. LITTLE INTEREST OR PLEASURE IN DOING THINGS: 1
7. TROUBLE CONCENTRATING ON THINGS, SUCH AS READING THE NEWSPAPER OR WATCHING TELEVISION: 0
SUM OF ALL RESPONSES TO PHQ QUESTIONS 1-9: 8
3. TROUBLE FALLING OR STAYING ASLEEP: 0
6. FEELING BAD ABOUT YOURSELF - OR THAT YOU ARE A FAILURE OR HAVE LET YOURSELF OR YOUR FAMILY DOWN: 1

## 2018-11-30 NOTE — PATIENT INSTRUCTIONS
1.  Pick a project like painting or something that you know how to do. 2.  Look into part time jobs. 3.  Give dad a call. 4.  Turn on your music and get on the exercise bike! 5. Take care of mom during her knee surgery. 6.  Return to see Dr. Angie Magallanes in 2-4 weeks.

## 2018-12-04 ENCOUNTER — OFFICE VISIT (OUTPATIENT)
Dept: PSYCHIATRY | Age: 33
End: 2018-12-04
Payer: COMMERCIAL

## 2018-12-04 VITALS
WEIGHT: 315 LBS | SYSTOLIC BLOOD PRESSURE: 157 MMHG | DIASTOLIC BLOOD PRESSURE: 94 MMHG | HEART RATE: 112 BPM | BODY MASS INDEX: 55.81 KG/M2

## 2018-12-04 DIAGNOSIS — F33.41 RECURRENT MAJOR DEPRESSIVE DISORDER, IN PARTIAL REMISSION (HCC): Primary | ICD-10-CM

## 2018-12-04 DIAGNOSIS — F41.1 GAD (GENERALIZED ANXIETY DISORDER): ICD-10-CM

## 2018-12-04 DIAGNOSIS — F90.0 ATTENTION DEFICIT HYPERACTIVITY DISORDER (ADHD), PREDOMINANTLY INATTENTIVE TYPE: ICD-10-CM

## 2018-12-04 PROCEDURE — 1036F TOBACCO NON-USER: CPT | Performed by: NURSE PRACTITIONER

## 2018-12-04 PROCEDURE — G8484 FLU IMMUNIZE NO ADMIN: HCPCS | Performed by: NURSE PRACTITIONER

## 2018-12-04 PROCEDURE — 99204 OFFICE O/P NEW MOD 45 MIN: CPT | Performed by: NURSE PRACTITIONER

## 2018-12-04 PROCEDURE — G8417 CALC BMI ABV UP PARAM F/U: HCPCS | Performed by: NURSE PRACTITIONER

## 2018-12-04 PROCEDURE — G8427 DOCREV CUR MEDS BY ELIG CLIN: HCPCS | Performed by: NURSE PRACTITIONER

## 2018-12-04 NOTE — PROGRESS NOTES
patient declined saying that he will keep an eye on that and wants to find another PCP for now. He denies any headaches, dizziness or lightheadedness. Says he feels okay. HPI      PSYCHIATRIC HISTORY:  Patient has had prior care with the following:    [x] Psychiatrist, Dr Isacc Rodriguez and others in the past    [x] Psychologist\" Dr Carolyne Beck    [] Other Therapist  [] None    The patient has had 0 lifetime suicide attempts. Patient reports 0 psych hospital admissions    Past psychiatric medications include: Prozac, Abilify, Effexor, Lamictal, Ritalin, Adderall and Wellbutrin    Adverse reactions from psychotropic medications:  None    Lifetime Psychiatric Review of Systems         Kell or Hypomania:  no     Panic Attacks:  no     Phobias:  no  Obsessions and Compulsions:  no     Body or Vocal Tics:  no     Hallucinations:  no     Delusions:  no    SOCIAL HISTORY:  Patient was born in [place] and raised in [address] by his biological parents. Parents are  at this time. Has a brother a sister. Patient is in touch with them sometimes. Patient does not believe in God. TOBACCO: denies  ETOH:  denies  DRUGS: denies  MARITAL STATUS: not , no children. Not in a relationship. OCCUPATION: not working and have never worked in his life. LEVEL OF EDUCATION: associates degree in IT  RESIDENCE: Currently lives with his parents, who support him      Social History     Social History    Marital status: Single     Spouse name: N/A    Number of children: 0    Years of education: 15     Occupational History    Not on file.      Social History Main Topics    Smoking status: Never Smoker    Smokeless tobacco: Never Used    Alcohol use No    Drug use: No    Sexual activity: Not Currently     Partners: Female     Other Topics Concern    Not on file     Social History Narrative    No narrative on file       FAMILY HISTORY:   Family History   Problem Relation Age of Onset    Arthritis Mother     Alcohol Abuse Father for homicidal ideation      Medication Side Effects: absent       Attention Span attention span and concentration were age appropriate                       DIAGNOSIS AND ASSESSMENT DATA     MDD  WATSON  Hx of ADHD    PLAN   Follow-up: in 3 months or sooner PRN  Continue with Lamictal, Seroquel and Viibryd at their current doses. Continue to see dr Giovanni Perez as needed. Prescriptions for this encounter:  New Prescriptions    No medications on file       No orders of the defined types were placed in this encounter. There are no discontinued medications. Additional orders:  No orders of the defined types were placed in this encounter. Risks, potential side effects, possible drug-drug interactions, benefits and alternate treatments discussed in detail. All questions answered. Patient stated understanding and is agreeable to treatment plan. Patient has been instructed to seek emergency help via the emergency and/or calling 911 should symptoms become severe, worsen, or with other concerning symptoms. Patient instructed to go immediately to the emergency room and/or call 911 with any suicidal or homicidal ideations or if audio/visual hallucinations develop  Patient stated understanding and agrees. Patient given crisis center information. I spent a total of 46 minutes with the patient and over half of that time was spent on counseling and coordination of care regarding topics discussed above.     Provider Signature:  Electronically signed by CHARITO Vazquez CNP on 12/4/2018 at 2:21 PM

## 2019-01-07 ENCOUNTER — OFFICE VISIT (OUTPATIENT)
Dept: PSYCHOLOGY | Age: 34
End: 2019-01-07
Payer: COMMERCIAL

## 2019-01-07 DIAGNOSIS — F33.2 MAJOR DEPRESSIVE DISORDER, RECURRENT EPISODE, SEVERE WITH ANXIOUS DISTRESS (HCC): Primary | ICD-10-CM

## 2019-01-07 PROCEDURE — 90832 PSYTX W PT 30 MINUTES: CPT | Performed by: PSYCHOLOGIST

## 2019-03-01 ENCOUNTER — OFFICE VISIT (OUTPATIENT)
Dept: PSYCHIATRY | Age: 34
End: 2019-03-01
Payer: COMMERCIAL

## 2019-03-01 DIAGNOSIS — F33.41 RECURRENT MAJOR DEPRESSIVE DISORDER, IN PARTIAL REMISSION (HCC): Primary | ICD-10-CM

## 2019-03-01 DIAGNOSIS — Z86.59 HISTORY OF ATTENTION DEFICIT HYPERACTIVITY DISORDER: ICD-10-CM

## 2019-03-01 PROCEDURE — G8484 FLU IMMUNIZE NO ADMIN: HCPCS | Performed by: NURSE PRACTITIONER

## 2019-03-01 PROCEDURE — 1036F TOBACCO NON-USER: CPT | Performed by: NURSE PRACTITIONER

## 2019-03-01 PROCEDURE — G8417 CALC BMI ABV UP PARAM F/U: HCPCS | Performed by: NURSE PRACTITIONER

## 2019-03-01 PROCEDURE — G8427 DOCREV CUR MEDS BY ELIG CLIN: HCPCS | Performed by: NURSE PRACTITIONER

## 2019-03-01 PROCEDURE — 99213 OFFICE O/P EST LOW 20 MIN: CPT | Performed by: NURSE PRACTITIONER

## 2019-03-04 ENCOUNTER — OFFICE VISIT (OUTPATIENT)
Dept: PSYCHOLOGY | Age: 34
End: 2019-03-04
Payer: COMMERCIAL

## 2019-03-04 DIAGNOSIS — F33.2 MAJOR DEPRESSIVE DISORDER, RECURRENT EPISODE, SEVERE WITH ANXIOUS DISTRESS (HCC): Primary | ICD-10-CM

## 2019-03-04 PROCEDURE — 90832 PSYTX W PT 30 MINUTES: CPT | Performed by: PSYCHOLOGIST

## 2019-05-20 ENCOUNTER — OFFICE VISIT (OUTPATIENT)
Dept: PSYCHOLOGY | Age: 34
End: 2019-05-20
Payer: COMMERCIAL

## 2019-05-20 DIAGNOSIS — F33.2 MAJOR DEPRESSIVE DISORDER, RECURRENT EPISODE, SEVERE WITH ANXIOUS DISTRESS (HCC): Primary | ICD-10-CM

## 2019-05-20 PROCEDURE — 90832 PSYTX W PT 30 MINUTES: CPT | Performed by: PSYCHOLOGIST

## 2019-05-20 NOTE — PROGRESS NOTES
Behavioral Health Consultation/Psychotherapy Note  [place] Lalo. Natalie Kelley Psy.D. Visit Date:  5/20/2019    Patient:  Jane Mack  YOB: 1985  Chief Complaint:  Follow-up; Depression; Anxiety; and Stress    Duration of session:  25 minutes      S:     Pt said he did nothing we talked about, saying he forgot all about it as soon as he left the office. Pt said he needs money to live and right now it's a struggle for him. He said it's nice to come to appts and talk, even if he isn't doing much to get anything out of it. He said he doesn't see his situation changing all that much. He said he has stopped trying to apply for disability. We reviewed healthy coping strategies and encouraged participation in activities. O:    Appearance    Patient presents as alert, oriented, and cooperative with moderate distress  Appetite abnormal: overeating  Sleep disturbance Yes  Loss of pleasure Yes  Speech    normal rate, normal volume, well articulated and clear and understandable  Mood    Depressed  Low self-esteem  Emptiness  [place]  Affect    depressed affect  Thought Process    linear, goal directed, coherent and linear and coherent  Insight    Fair  Judgment    Intact  Memory    recent and remote memory intact  Suicide Assessment    Pt reported intermittent suicidal ideation with no plans or intent; he was able to contract for safety        A:    1. Major depressive disorder, recurrent episode, severe with anxious distress (HCC)        Pt needs to accept being denied from disability and move forward with his life, and he's struggling mightily with that now. [facility] not benefitted from any of the CBT interventions to this point.  Pt's external locus of control makes it hard for him to take personal responsibility. We will try to shift focus to his self-concept through supportive counseling. P:    1. Return to see Dr. Natalie Kelley in 4-6 weeks.     All questions about treatment plan answered. Patient instructed to go immediately to the emergency room and/or call 911 if any suicidal or homicidal ideations. Patient stated understanding and is agreeable to treatment and crisis plan. Provider Signature:  Electronically signed by SHAKILA Oneal on 5/20/2019 at 1:56 PM

## 2019-05-30 NOTE — TELEPHONE ENCOUNTER
Mirza Burnham is unable to attend his last scheduled appointment on 6/3 due to a scheduling conflict with his transportation. He is now rescheduled with Dr. Rox Villeda on July 17th. His mother has requested refills on Viibryd 40mg and Lamictal 200mg to have on file so that he does not have any issues getting his medication until he is seen. His last scheduled appointment was on March 1st.    Mother reports that he is doing well.

## 2019-06-03 RX ORDER — VILAZODONE HYDROCHLORIDE 40 MG/1
TABLET ORAL
Qty: 30 TABLET | Refills: 1 | Status: SHIPPED | OUTPATIENT
Start: 2019-06-03 | End: 2019-07-25 | Stop reason: SDUPTHER

## 2019-06-03 RX ORDER — LAMOTRIGINE 200 MG/1
200 TABLET ORAL DAILY
Qty: 30 TABLET | Refills: 1 | Status: SHIPPED | OUTPATIENT
Start: 2019-06-03 | End: 2019-07-25 | Stop reason: SDUPTHER

## 2019-07-01 ENCOUNTER — OFFICE VISIT (OUTPATIENT)
Dept: PSYCHOLOGY | Age: 34
End: 2019-07-01
Payer: COMMERCIAL

## 2019-07-01 DIAGNOSIS — F33.2 MAJOR DEPRESSIVE DISORDER, RECURRENT EPISODE, SEVERE WITH ANXIOUS DISTRESS (HCC): Primary | ICD-10-CM

## 2019-07-01 PROCEDURE — 90832 PSYTX W PT 30 MINUTES: CPT | Performed by: PSYCHOLOGIST

## 2019-07-01 ASSESSMENT — PATIENT HEALTH QUESTIONNAIRE - PHQ9
SUM OF ALL RESPONSES TO PHQ QUESTIONS 1-9: 12
4. FEELING TIRED OR HAVING LITTLE ENERGY: 3
6. FEELING BAD ABOUT YOURSELF - OR THAT YOU ARE A FAILURE OR HAVE LET YOURSELF OR YOUR FAMILY DOWN: 2
2. FEELING DOWN, DEPRESSED OR HOPELESS: 1
9. THOUGHTS THAT YOU WOULD BE BETTER OFF DEAD, OR OF HURTING YOURSELF: 1
8. MOVING OR SPEAKING SO SLOWLY THAT OTHER PEOPLE COULD HAVE NOTICED. OR THE OPPOSITE, BEING SO FIGETY OR RESTLESS THAT YOU HAVE BEEN MOVING AROUND A LOT MORE THAN USUAL: 0
SUM OF ALL RESPONSES TO PHQ9 QUESTIONS 1 & 2: 2
1. LITTLE INTEREST OR PLEASURE IN DOING THINGS: 1
10. IF YOU CHECKED OFF ANY PROBLEMS, HOW DIFFICULT HAVE THESE PROBLEMS MADE IT FOR YOU TO DO YOUR WORK, TAKE CARE OF THINGS AT HOME, OR GET ALONG WITH OTHER PEOPLE: 1
3. TROUBLE FALLING OR STAYING ASLEEP: 3
SUM OF ALL RESPONSES TO PHQ QUESTIONS 1-9: 12
7. TROUBLE CONCENTRATING ON THINGS, SUCH AS READING THE NEWSPAPER OR WATCHING TELEVISION: 1
5. POOR APPETITE OR OVEREATING: 0

## 2019-07-25 ENCOUNTER — OFFICE VISIT (OUTPATIENT)
Dept: PSYCHIATRY | Age: 34
End: 2019-07-25
Payer: COMMERCIAL

## 2019-07-25 DIAGNOSIS — F31.89 OTHER BIPOLAR DISORDER (HCC): Primary | ICD-10-CM

## 2019-07-25 DIAGNOSIS — F90.8 ADHD, ADULT RESIDUAL TYPE: ICD-10-CM

## 2019-07-25 PROCEDURE — 90792 PSYCH DIAG EVAL W/MED SRVCS: CPT | Performed by: PSYCHIATRY & NEUROLOGY

## 2019-07-25 PROCEDURE — 1036F TOBACCO NON-USER: CPT | Performed by: PSYCHIATRY & NEUROLOGY

## 2019-07-25 RX ORDER — VILAZODONE HYDROCHLORIDE 40 MG/1
TABLET ORAL
Qty: 30 TABLET | Refills: 5 | Status: SHIPPED | OUTPATIENT
Start: 2019-07-25 | End: 2020-03-10 | Stop reason: SDUPTHER

## 2019-07-25 RX ORDER — LAMOTRIGINE 200 MG/1
200 TABLET ORAL DAILY
Qty: 30 TABLET | Refills: 5 | Status: SHIPPED | OUTPATIENT
Start: 2019-07-25 | End: 2020-03-10 | Stop reason: SDUPTHER

## 2019-07-25 RX ORDER — QUETIAPINE FUMARATE 50 MG/1
50 TABLET, FILM COATED ORAL NIGHTLY
Qty: 30 TABLET | Refills: 5 | Status: SHIPPED | OUTPATIENT
Start: 2019-07-25 | End: 2019-11-14

## 2019-07-25 NOTE — PROGRESS NOTES
clear-cut and full manic or hypomanic episodes or full major depression. Family history is negative for any bipolar illness in his ancestry. He did note that his biologic father was an alcoholic and he has a cousin who is bipolar. Other than that he does not know of any history in the family. His own psychiatric history is negative for any hospitalizations. He has seen both Bijan Iglesias and Dr. Tonya Lopez in the past.  He has been on medicine much of his life. He was on Prozac quite a long time, and has had trials of Ritalin, Adderall, Wellbutrin, and Abilify. There may have been others that he does not recall. Has not had lithium, but is currently on lamotrigine and Seroquel. He is also taking Viibryd. He said those drugs have made a substantial improvement. Medical:    He is obese, but denies any diabetes. He has hypertension, hypercholesterolemia. He is apparently not on any medications except for some vitamin D and fish oil. There are no known medication allergies. Childhood:    He grew up in [address] [place] [address]. He did not recall any abuse or trauma during childhood. His father was an alcoholic but kept it hidden, and he was not aware of dad's drinking. His parents  when he was about age 26. Education:    He did graduate high school and attended college. He said he got a 2-year degree in [employer],  in information technology. Employment:    He has never been able to work due to social anxiety. Marital:    Never , no children. Family history:     Negative except father was alcoholic. Psychiatric history:    Never hospitalized, he has been on medications and in psychotherapy much of his life.     Mental Status Examination    Level of consciousness:  within normal limits  Appearance:  well-appearing, street clothes, in chair, good grooming and good hygiene  Behavior/Motor:  no abnormalities noted  Attitude toward examiner:  cooperative, attentive and good eye

## 2019-08-26 ENCOUNTER — OFFICE VISIT (OUTPATIENT)
Dept: PSYCHOLOGY | Age: 34
End: 2019-08-26
Payer: COMMERCIAL

## 2019-08-26 DIAGNOSIS — F34.1 DYSTHYMIA: ICD-10-CM

## 2019-08-26 DIAGNOSIS — F33.2 MAJOR DEPRESSIVE DISORDER, RECURRENT EPISODE, SEVERE WITH ANXIOUS DISTRESS (HCC): Primary | ICD-10-CM

## 2019-08-26 PROCEDURE — 90832 PSYTX W PT 30 MINUTES: CPT | Performed by: PSYCHOLOGIST

## 2019-08-29 ENCOUNTER — OFFICE VISIT (OUTPATIENT)
Dept: PSYCHIATRY | Age: 34
End: 2019-08-29
Payer: COMMERCIAL

## 2019-08-29 DIAGNOSIS — F90.8 ADHD, ADULT RESIDUAL TYPE: ICD-10-CM

## 2019-08-29 DIAGNOSIS — F31.89 OTHER BIPOLAR DISORDER (HCC): Primary | ICD-10-CM

## 2019-08-29 DIAGNOSIS — F41.1 GAD (GENERALIZED ANXIETY DISORDER): ICD-10-CM

## 2019-08-29 PROCEDURE — G8428 CUR MEDS NOT DOCUMENT: HCPCS | Performed by: PSYCHIATRY & NEUROLOGY

## 2019-08-29 PROCEDURE — G8417 CALC BMI ABV UP PARAM F/U: HCPCS | Performed by: PSYCHIATRY & NEUROLOGY

## 2019-08-29 PROCEDURE — 99213 OFFICE O/P EST LOW 20 MIN: CPT | Performed by: PSYCHIATRY & NEUROLOGY

## 2019-08-29 PROCEDURE — 1036F TOBACCO NON-USER: CPT | Performed by: PSYCHIATRY & NEUROLOGY

## 2019-09-25 ENCOUNTER — OFFICE VISIT (OUTPATIENT)
Dept: PSYCHIATRY | Age: 34
End: 2019-09-25
Payer: COMMERCIAL

## 2019-09-25 DIAGNOSIS — F90.8 ADHD, ADULT RESIDUAL TYPE: ICD-10-CM

## 2019-09-25 DIAGNOSIS — F41.1 GAD (GENERALIZED ANXIETY DISORDER): ICD-10-CM

## 2019-09-25 DIAGNOSIS — F31.89 OTHER BIPOLAR DISORDER (HCC): Primary | ICD-10-CM

## 2019-09-25 PROCEDURE — 1036F TOBACCO NON-USER: CPT | Performed by: PSYCHIATRY & NEUROLOGY

## 2019-09-25 PROCEDURE — G8417 CALC BMI ABV UP PARAM F/U: HCPCS | Performed by: PSYCHIATRY & NEUROLOGY

## 2019-09-25 PROCEDURE — 99213 OFFICE O/P EST LOW 20 MIN: CPT | Performed by: PSYCHIATRY & NEUROLOGY

## 2019-09-25 PROCEDURE — G8428 CUR MEDS NOT DOCUMENT: HCPCS | Performed by: PSYCHIATRY & NEUROLOGY

## 2019-09-25 NOTE — PROGRESS NOTES
Generalized: Yes  Excessive Worry: No  Panic Attacks: No  Frequency:   Housebound: No   Obsession: No   Compulsion: No    DIAGNOSTIC IMPRESSION  Other bipolar   ADHD  WATSON    Plan  No changes  Current Outpatient Medications   Medication Sig Dispense Refill    vilazodone HCl (VIIBRYD) 40 MG TABS Take one  tablet every morning MUST TAKE WITH FOOD TO ACTIVATE 30 tablet 5    lamoTRIgine (LAMICTAL) 200 MG tablet Take 1 tablet by mouth daily 30 tablet 5    QUEtiapine (SEROQUEL) 50 MG tablet Take 1 tablet by mouth nightly 30 tablet 5    Cholecalciferol (VITAMIN D) 2000 units TABS tablet Take 2 tablets by mouth daily 60 tablet 5    Omega-3 Fatty Acids (FISH OIL BURP-LESS) 1200 MG CAPS Take 2 tablets by mouth 2 times daily 120 capsule 5    Vitamins-Lipotropics (BALANCED B-100 COMPLEX CR) TBCR Take 1 tablet by mouth 4 times daily (after meals and at bedtime) 120 tablet 5     No current facility-administered medications for this visit.

## 2019-10-28 ENCOUNTER — OFFICE VISIT (OUTPATIENT)
Dept: PSYCHOLOGY | Age: 34
End: 2019-10-28
Payer: COMMERCIAL

## 2019-10-28 DIAGNOSIS — F33.2 MAJOR DEPRESSIVE DISORDER, RECURRENT EPISODE, SEVERE WITH ANXIOUS DISTRESS (HCC): ICD-10-CM

## 2019-10-28 DIAGNOSIS — F34.1 DYSTHYMIA: Primary | ICD-10-CM

## 2019-10-28 PROCEDURE — 90832 PSYTX W PT 30 MINUTES: CPT | Performed by: PSYCHOLOGIST

## 2019-11-04 ENCOUNTER — OFFICE VISIT (OUTPATIENT)
Dept: PSYCHIATRY | Age: 34
End: 2019-11-04
Payer: COMMERCIAL

## 2019-11-04 DIAGNOSIS — F31.89 OTHER BIPOLAR DISORDER (HCC): Primary | ICD-10-CM

## 2019-11-04 DIAGNOSIS — F90.8 ADHD, ADULT RESIDUAL TYPE: ICD-10-CM

## 2019-11-04 DIAGNOSIS — F41.1 GAD (GENERALIZED ANXIETY DISORDER): ICD-10-CM

## 2019-11-04 PROCEDURE — 99213 OFFICE O/P EST LOW 20 MIN: CPT | Performed by: PSYCHIATRY & NEUROLOGY

## 2019-11-04 PROCEDURE — 1036F TOBACCO NON-USER: CPT | Performed by: PSYCHIATRY & NEUROLOGY

## 2019-11-04 PROCEDURE — G8484 FLU IMMUNIZE NO ADMIN: HCPCS | Performed by: PSYCHIATRY & NEUROLOGY

## 2019-11-04 PROCEDURE — G8428 CUR MEDS NOT DOCUMENT: HCPCS | Performed by: PSYCHIATRY & NEUROLOGY

## 2019-11-04 PROCEDURE — G8417 CALC BMI ABV UP PARAM F/U: HCPCS | Performed by: PSYCHIATRY & NEUROLOGY

## 2019-11-14 RX ORDER — QUETIAPINE FUMARATE 100 MG/1
TABLET, FILM COATED ORAL
Qty: 30 TABLET | Refills: 0 | Status: SHIPPED | OUTPATIENT
Start: 2019-11-14 | End: 2019-12-16 | Stop reason: SDUPTHER

## 2019-12-16 RX ORDER — QUETIAPINE FUMARATE 100 MG/1
TABLET, FILM COATED ORAL
Qty: 30 TABLET | Refills: 0 | Status: SHIPPED | OUTPATIENT
Start: 2019-12-16 | End: 2020-01-20 | Stop reason: SDUPTHER

## 2020-01-15 ENCOUNTER — HOSPITAL ENCOUNTER (EMERGENCY)
Age: 35
Discharge: HOME | End: 2020-01-15
Payer: COMMERCIAL

## 2020-01-15 VITALS
WEIGHT: 315 LBS | HEART RATE: 108 BPM | TEMPERATURE: 97.3 F | OXYGEN SATURATION: 95 % | RESPIRATION RATE: 22 BRPM | HEIGHT: 72 IN | SYSTOLIC BLOOD PRESSURE: 177 MMHG | BODY MASS INDEX: 42.66 KG/M2 | DIASTOLIC BLOOD PRESSURE: 123 MMHG

## 2020-01-15 PROCEDURE — 99212 OFFICE O/P EST SF 10 MIN: CPT

## 2020-01-15 PROCEDURE — 99213 OFFICE O/P EST LOW 20 MIN: CPT | Performed by: NURSE PRACTITIONER

## 2020-01-15 RX ORDER — OFLOXACIN 3 MG/ML
5 SOLUTION AURICULAR (OTIC) 2 TIMES DAILY
Qty: 5 ML | Refills: 0 | Status: SHIPPED | OUTPATIENT
Start: 2020-01-15 | End: 2020-01-25

## 2020-01-15 RX ORDER — GUAIFENESIN DEXTROMETHORPHAN HYDROBROMIDE ORAL SOLUTION 10; 100 MG/5ML; MG/5ML
10 SOLUTION ORAL EVERY 4 HOURS PRN
COMMUNITY
End: 2020-11-30

## 2020-01-15 ASSESSMENT — ENCOUNTER SYMPTOMS
SHORTNESS OF BREATH: 0
VOMITING: 0
SORE THROAT: 1
NAUSEA: 0
COUGH: 1

## 2020-01-15 ASSESSMENT — PAIN SCALES - GENERAL: PAINLEVEL_OUTOF10: 2

## 2020-01-15 ASSESSMENT — PAIN DESCRIPTION - LOCATION: LOCATION: EAR

## 2020-01-15 ASSESSMENT — PAIN DESCRIPTION - ORIENTATION: ORIENTATION: RIGHT

## 2020-01-15 NOTE — ED NOTES
Discussed with pt the need to follow up for blood pressure. Pt states its because he has been using OTC cold meds, and  He Stopped them today. Advised to check with pharmacy from now on to get cold medication for people with high blood pressure. Verbalized understanding. Declined  B/p recheck.      Allegra Cope LPN  10/19/77 [id number]

## 2020-01-15 NOTE — ED PROVIDER NOTES
[facility]       CHIEF COMPLAINT       Chief Complaint   Patient presents with    Otalgia     right ear, with drainage       Nurses Notes reviewed and I agree except as noted in the HPI. HISTORY OF PRESENT ILLNESS   Vito Carmen is a 36 y.o. male who presents for evaluation of right ear pain and drainage is been ongoing for the past day. Patient does report cough and congestion that been ongoing for the past week and states that his entire family has a \"cold\". Patient denies any chest pain, shortness of breath, headaches or any other issues. He denies any diagnosis of hypertension in the past.  He does state that he has been taking multiple over-the-counter cough medications on a very regular basis. The history is provided by the patient. REVIEW OF SYSTEMS     Review of Systems   Constitutional: Negative for chills and fever. HENT: Positive for congestion, ear discharge, ear pain and sore throat. Respiratory: Positive for cough. Negative for shortness of breath. Cardiovascular: Negative for chest pain. Gastrointestinal: Negative for nausea and vomiting. Musculoskeletal: Negative for arthralgias and myalgias. Skin: Negative for rash. Allergic/Immunologic: Negative for environmental allergies. Neurological: Negative for headaches. PAST MEDICAL HISTORY         Diagnosis Date    ADHD (attention deficit hyperactivity disorder) 57    Anxiety     Depression 2000    Obesity 1990's       SURGICALHISTORY     Patient  has a past surgical history that includes Tonsillectomy (1988) and Adenoidectomy (1988).     CURRENT MEDICATIONS       Previous Medications    CHOLECALCIFEROL (VITAMIN D) 2000 UNITS TABS TABLET    Take 2 tablets by mouth daily    DEXTROMETHORPHAN-GUAIFENESIN (ROBITUSSIN COLD COUGH+ CHEST)  MG/5ML SYRUP    Take 10 mLs by mouth every 4 hours as needed for Cough    LAMOTRIGINE (LAMICTAL) 200 MG TABLET    Take 1 tablet by mouth daily    OMEGA-3 FATTY ACIDS (FISH OIL BURP-LESS) 1200 MG CAPS    Take 2 tablets by mouth 2 times daily    QUETIAPINE (SEROQUEL) 100 MG TABLET    TAKE 1 TABLET BY MOUTH IN THE EVENING    VILAZODONE HCL (VIIBRYD) 40 MG TABS    Take one  tablet every morning MUST TAKE WITH FOOD TO ACTIVATE    VITAMINS-LIPOTROPICS (BALANCED B-100 COMPLEX CR) TBCR    Take 1 tablet by mouth 4 times daily (after meals and at bedtime)       ALLERGIES     Patient is has No Known Allergies. Patients   Immunization History   Administered Date(s) Administered    Influenza Virus Vaccine 10/20/2014    Influenza, Albia Pound, IM, (6 mo and older Fluzone, Flulaval, Fluarix and 3 yrs and older Afluria) 02/14/2017, 10/27/2017    Tdap (Boostrix, Adacel) 10/20/2014       FAMILY HISTORY     Patient's family history includes Alcohol Abuse in his father; Arthritis in his maternal aunt and mother; COPD in his paternal grandfather; Stroke in his paternal grandmother; Stroke (age of onset: [de-identified]) in his maternal grandmother. SOCIAL HISTORY     Patient  reports that he has never smoked. He has never used smokeless tobacco. He reports that he does not drink alcohol or use drugs. PHYSICAL EXAM     ED TRIAGE VITALS  BP: (!) 177/123, Temp: 97.3 °F (36.3 °C), Pulse: 108, Resp: 22, SpO2: 95 %,Estimated body mass index is 54.25 kg/m² as calculated from the following:    Height as of this encounter: 6' (1.829 m). Weight as of this encounter: 400 lb (181.4 kg). ,No LMP for male patient. Physical Exam  Vitals signs and nursing note reviewed. Constitutional:       General: He is not in acute distress. Appearance: He is well-developed. He is not diaphoretic. HENT:      Right Ear: Drainage and swelling present. Left Ear: Tympanic membrane and ear canal normal.   Eyes:      Conjunctiva/sclera:      Right eye: Right conjunctiva is not injected. Left eye: Left conjunctiva is not injected.       Pupils: Pupils are equal.   Neck: Musculoskeletal: Normal range of motion. Cardiovascular:      Rate and Rhythm: Normal rate and regular rhythm. Heart sounds: No murmur. Pulmonary:      Effort: Pulmonary effort is normal. No respiratory distress. Breath sounds: Normal breath sounds. Musculoskeletal:      Right knee: He exhibits normal range of motion. Left knee: He exhibits normal range of motion. Skin:     General: Skin is warm. Findings: No rash. Neurological:      Mental Status: He is alert and oriented to person, place, and time. Psychiatric:         Behavior: Behavior normal.         DIAGNOSTIC RESULTS     Labs:No results found for this visit on 01/15/20. IMAGING:    No orders to display         EKG:  none    URGENT CARE COURSE:     Vitals:    01/15/20 1601 01/15/20 1606   BP: (!) 182/117 (!) 177/123   Pulse: 108    Resp: 22    Temp: 97.3 °F (36.3 °C)    TempSrc: Infrared    SpO2: 95%    Weight: (!) 400 lb (181.4 kg)    Height: 6' (1.829 m)        Medications - No data to display         PROCEDURES:  None    FINAL IMPRESSION      1. Infective otitis externa of right ear    2. Elevated blood pressure reading          DISPOSITION/ PLAN     Exam is consistent with otitis externa at this time. I discussed with the patient the plan to treat with topical antibiotic eardrops he is advised use Tylenol and ibuprofen for pain. I did discuss with the patient that I believe he should discontinue the over-the-counter cough medications based on his elevated blood pressure reading at this time and he is instructed to begin taking over-the-counter Coricidin HBP. I did discuss with the patient that he should follow-up with his primary care provider regarding the elevated blood pressure readings or present directly to the ER if he begins having any chest pain, shortness of breath or severe headaches. He is agreeable to plan as discussed we will follow-up on an outpatient basis.       PATIENT REFERRED TO:  Carla Combs

## 2020-01-15 NOTE — ED NOTES
Pt. Released in stable condition, ambulated per self to private car. Instructed pt to follow-up with family doctor as needed for recheck or go directly to the emergency department for any concerns/worsening conditions. Pt. Verbalized understanding of instructions. No questions at this time. RX in hand.       Gilma Fleming RN  01/15/20 8339

## 2020-01-16 ENCOUNTER — TELEPHONE (OUTPATIENT)
Dept: FAMILY MEDICINE CLINIC | Age: 35
End: 2020-01-16

## 2020-01-16 NOTE — LETTER
[address]  [address] [facility] , [address]  Phone: [phone]  Fax: [phone]    January 16, 2020    Wero Becker  [address] [facility],[address]  [address] [address]      Dear Kevin Ulrich,    Thank you for choosing [address] [address] Emergency Department on 1/15/20. Dr. Geovanni Sanchez wanted to make sure that you understand your discharge instructions and that you were able to fill any prescriptions that may have been ordered for you. Please contact the office at the above phone number if the ED advised to you follow up with Dr. Geovanni Sanchez, or if you have any further questions or needs. Also did you know -   *Visiting the ED for a non-emergency could result in higher co-pays than you would normally be subject to paying? *You can call your doctor even after hours so they can direct you to the most appropriate care. [facility]) practices can often offer you an appointment on the same day that you call. *We have some [facility] offices that offer Walk-in appointments; check our website for availability in your community, www. [employer].      *Evisits are now available for patients for $36 through [employer] for certain conditions:  * Sinus, cold and or cough       * Diarrhea            * Headache       *vaginal discharge     *flu symptoms  * Heartburn                                * Poison Judi          * Back pain     * Urinary problems                               If you do not have [facility] and are interested, please contact the office and a staff member may assist you or go to [email or web address].     Sincerely,     Geovanni Sanchez MD and your [facility]

## 2020-01-20 RX ORDER — QUETIAPINE FUMARATE 100 MG/1
TABLET, FILM COATED ORAL
Qty: 30 TABLET | Refills: 1 | Status: SHIPPED | OUTPATIENT
Start: 2020-01-20 | End: 2020-03-10 | Stop reason: SDUPTHER

## 2020-01-31 ENCOUNTER — OFFICE VISIT (OUTPATIENT)
Dept: PSYCHOLOGY | Age: 35
End: 2020-01-31
Payer: COMMERCIAL

## 2020-01-31 PROCEDURE — 90832 PSYTX W PT 30 MINUTES: CPT | Performed by: PSYCHOLOGIST

## 2020-01-31 NOTE — PATIENT INSTRUCTIONS
Starting an exercise regimen where you just move might help you create more energy. List of agencies that may do testing for [address], Ph.D  Bradley Hernandez, Ph.D  Ritu Tejeda, [address] [place] [address]. SHANTI CARDOZA II.Red HERBERT  [phone]    Practice of Clinical Psychology  Luis Armando Swartz, Ph.D  [facility], [address]  [phone]    Opportunities for [address] with Disabilities  [address] [place] [address].   SHANTI CARDOZA II.Yvonne HERBERT[address]  Phone: [phone]

## 2020-03-11 RX ORDER — LAMOTRIGINE 200 MG/1
200 TABLET ORAL DAILY
Qty: 30 TABLET | Refills: 0 | Status: SHIPPED | OUTPATIENT
Start: 2020-03-11 | End: 2020-04-01 | Stop reason: SDUPTHER

## 2020-03-11 RX ORDER — QUETIAPINE FUMARATE 100 MG/1
TABLET, FILM COATED ORAL
Qty: 30 TABLET | Refills: 0 | Status: SHIPPED | OUTPATIENT
Start: 2020-03-11 | End: 2020-04-01 | Stop reason: SDUPTHER

## 2020-03-11 RX ORDER — VILAZODONE HYDROCHLORIDE 40 MG/1
TABLET ORAL
Qty: 30 TABLET | Refills: 0 | Status: SHIPPED | OUTPATIENT
Start: 2020-03-11 | End: 2020-04-01 | Stop reason: SDUPTHER

## 2020-03-23 ENCOUNTER — OFFICE VISIT (OUTPATIENT)
Dept: PSYCHOLOGY | Age: 35
End: 2020-03-23
Payer: COMMERCIAL

## 2020-03-23 PROCEDURE — 90832 PSYTX W PT 30 MINUTES: CPT | Performed by: PSYCHOLOGIST

## 2020-04-01 ENCOUNTER — VIRTUAL VISIT (OUTPATIENT)
Dept: PSYCHIATRY | Age: 35
End: 2020-04-01
Payer: COMMERCIAL

## 2020-04-01 PROCEDURE — G8417 CALC BMI ABV UP PARAM F/U: HCPCS | Performed by: PSYCHIATRY & NEUROLOGY

## 2020-04-01 PROCEDURE — 99212 OFFICE O/P EST SF 10 MIN: CPT | Performed by: PSYCHIATRY & NEUROLOGY

## 2020-04-01 PROCEDURE — G8428 CUR MEDS NOT DOCUMENT: HCPCS | Performed by: PSYCHIATRY & NEUROLOGY

## 2020-04-01 PROCEDURE — 1036F TOBACCO NON-USER: CPT | Performed by: PSYCHIATRY & NEUROLOGY

## 2020-04-01 RX ORDER — QUETIAPINE FUMARATE 100 MG/1
TABLET, FILM COATED ORAL
Qty: 30 TABLET | Refills: 5 | Status: SHIPPED | OUTPATIENT
Start: 2020-04-01 | End: 2020-10-12 | Stop reason: SDUPTHER

## 2020-04-01 RX ORDER — VILAZODONE HYDROCHLORIDE 40 MG/1
TABLET ORAL
Qty: 30 TABLET | Refills: 5 | Status: SHIPPED | OUTPATIENT
Start: 2020-04-01 | End: 2020-10-12 | Stop reason: SDUPTHER

## 2020-04-01 RX ORDER — LAMOTRIGINE 200 MG/1
200 TABLET ORAL DAILY
Qty: 30 TABLET | Refills: 5 | Status: SHIPPED | OUTPATIENT
Start: 2020-04-01 | End: 2020-10-12 | Stop reason: SDUPTHER

## 2020-04-01 NOTE — PROGRESS NOTES
Chief Complaint   Patient presents with    6 Month Follow-Up     Other bipolar ADHD WATSON     Gabriele Sol is a 36 y.o. male being evaluated by a Virtual Visit (video visit) encounter to address concerns as mentioned above. A caregiver was present when appropriate. Due to this being a TeleHealth encounter (During [facility]-16 public health emergency), evaluation of the following organ systems was limited: Vitals/Constitutional/EENT/Resp/CV/GI//MS/Neuro/Skin/Heme-Lymph-Imm. Pursuant to the emergency declaration under the [facility], [address] [place] [address] authority and the [employer] Act, this Virtual Visit was conducted with patient's (and/or legal guardian's) consent, to reduce the patient's risk of exposure to COVID-19 and provide necessary medical care. The patient (and/or legal guardian) has also been advised to contact this office for worsening conditions or problems, and seek emergency medical treatment and/or call 911 if deemed necessary. Services were provided through a video synchronous discussion virtually to substitute for in-person clinic visit. Patient and provider were located at their individual homes. --Sage Ambriz MD on 4/1/2020 at 3:00 PM    An electronic signature was used to authenticate this note. Pt was at home and I was in office. Patient was seen after 6 months to follow-up on other bipolar disorder, ADHD, and generalized anxiety disorder. He reports being anxious because of the coronavirus epidemic. However, symptomatically he is doing well and did not desire any changes. He was in need of a complete set of refills which was provided without any alteration. He denied any bipolar, ADHD, or WATSON symptoms aside from the worry about the virus.     Mental Status Examination    Level of consciousness:  within normal limits  Appearance:  well-appearing, street clothes, in chair, fair grooming

## 2020-06-22 ENCOUNTER — VIRTUAL VISIT (OUTPATIENT)
Dept: PSYCHOLOGY | Age: 35
End: 2020-06-22

## 2020-06-22 NOTE — PROGRESS NOTES
agreeable to treatment and crisis plan.           Provider Signature:  Electronically signed by Hema Young PSYD on 6/22/2020 at 12:49 PM

## 2020-09-21 ENCOUNTER — VIRTUAL VISIT (OUTPATIENT)
Dept: PSYCHOLOGY | Age: 35
End: 2020-09-21

## 2020-09-21 NOTE — PROGRESS NOTES
Behavioral Health Consultation/Psychotherapy Note  Vaishali Boggs. Silver Wayne Psy.D. Visit Date:  9/21/2020    Patient:  Rowan Velarde  YOB: 1985  Chief Complaint:  Follow-up and Depression    Duration of session:  15 minutes      S:     This session was conducted as a telepsychology visit due to [facility] restrictions placed on in-person visits d/t the COVID-19 outbreak. Patient Location: Home       Provider [facility] ([place]/State[facility] [address] [facility]    Patient gave verbal consent for teleservices and will sign a consent form when feasible. This virtual visit was conducted via interactive/real-time audio/video, using phone (audio only). Ct doing well overall. He has no concerns about mood or anxiety issues. He has been keeping busy with social/online activities, lupe, etc.  He continues to struggle with motivation. We talked about the role of exercise in helping to increase his energy and motivation as well as pairing interesting activities with the work he wants to get done. O:    Sleep disturbance No  Loss of pleasure Some  Speech    normal rate, normal volume, well articulated and clear and understandable  Mood    Dysthymic  Thought Process    linear, goal directed, coherent and linear and coherent  Insight    Fair  Judgment    Intact  Memory    recent and remote memory intact  Suicide Assessment    Denied SI, plans or intent      A:    1. Dysthymia    2. Major depressive disorder, recurrent episode, severe with anxious distress ([address]        Dysthymia seems to be part of his presentation with the MDD episodes coming and going.  Overall, ct is stable.  Will continue to encourage behavioral activation and use supportive counseling. P:    Phone call in 3 mos. All questions about treatment plan answered. Patient instructed to go immediately to the emergency room and/or call 911 if any suicidal or homicidal ideations.  Patient stated understanding and is agreeable to treatment and crisis plan.           Provider Signature:  Electronically signed by Haley Haynes PSYD on 9/21/2020 at 1:45 PM

## 2020-10-09 NOTE — TELEPHONE ENCOUNTER
Jeanne Elle (mother) called into the office stating that Brien Dang has an appt on 10/19/20 but he will run out of his 3 prescriptions by that time:    Lamictal 200mg;#30 with 5 refills,   Seroquel 100mg;#30 with 5 refills and   Viibryd 40mg;#30 with 5 refills; all last sent on 04/01/20. All medications are pending your approval for 30 day supplies with 5 refills; he was last seen on 04/01/20.

## 2020-10-12 RX ORDER — VILAZODONE HYDROCHLORIDE 40 MG/1
TABLET ORAL
Qty: 30 TABLET | Refills: 5 | Status: SHIPPED | OUTPATIENT
Start: 2020-10-12 | End: 2021-04-08 | Stop reason: SDUPTHER

## 2020-10-12 RX ORDER — QUETIAPINE FUMARATE 100 MG/1
TABLET, FILM COATED ORAL
Qty: 30 TABLET | Refills: 5 | Status: SHIPPED | OUTPATIENT
Start: 2020-10-12 | End: 2021-09-14

## 2020-10-12 RX ORDER — LAMOTRIGINE 200 MG/1
200 TABLET ORAL DAILY
Qty: 30 TABLET | Refills: 5 | Status: SHIPPED | OUTPATIENT
Start: 2020-10-12 | End: 2021-04-08 | Stop reason: SDUPTHER

## 2020-11-30 ENCOUNTER — VIRTUAL VISIT (OUTPATIENT)
Dept: PSYCHIATRY | Age: 35
End: 2020-11-30
Payer: COMMERCIAL

## 2020-11-30 PROCEDURE — 99214 OFFICE O/P EST MOD 30 MIN: CPT | Performed by: PSYCHIATRY & NEUROLOGY

## 2020-11-30 PROCEDURE — G8428 CUR MEDS NOT DOCUMENT: HCPCS | Performed by: PSYCHIATRY & NEUROLOGY

## 2020-11-30 PROCEDURE — G8417 CALC BMI ABV UP PARAM F/U: HCPCS | Performed by: PSYCHIATRY & NEUROLOGY

## 2020-11-30 PROCEDURE — G8484 FLU IMMUNIZE NO ADMIN: HCPCS | Performed by: PSYCHIATRY & NEUROLOGY

## 2020-11-30 PROCEDURE — 1036F TOBACCO NON-USER: CPT | Performed by: PSYCHIATRY & NEUROLOGY

## 2020-11-30 NOTE — PROGRESS NOTES
Chief Complaint   Patient presents with    6 Month Follow-Up     Bipplar WATSON ADHD     Magda Rosen is a [address] y.o. male being evaluated by a Virtual Visit (video visit) encounter to address concerns as mentioned above. A caregiver was present when appropriate. Due to this being a TeleHealth encounter (During [facility]-06 public health emergency), evaluation of the following organ systems was limited: Vitals/Constitutional/EENT/Resp/CV/GI//MS/Neuro/Skin/Heme-Lymph-Imm. Pursuant to the emergency declaration under the [facility], [address] [place] [address] and the [employer] Act, this Virtual Visit was conducted with patient's (and/or legal guardian's) consent, to reduce the patient's risk of exposure to COVID-19 and provide necessary medical care. The patient (and/or legal guardian) has also been advised to contact this office for worsening conditions or problems, and seek emergency medical treatment and/or call 911 if deemed necessary. Patient identification was verified at the start of the visit: Yes    Total time spent for this encounter: Not billed by time    Services were provided through a video synchronous discussion virtually to substitute for in-person clinic visit. Patient and provider were located at their individual homes. --Edmar Esquivel MD on 11/30/2020 at 3:13 PM    An electronic signature was used to authenticate this note. Anne Murphy was interviewed by video hookup for this appointment. He is generally doing well, although he does complain some of being tired. He thinks that it happens this time of year more often than not, but really was not very certain about it. He is not reporting much in the way of irritability. He thinks that his medicines are working adequately. He has ample supplies on all 3. He is denying any side effect issues and had no questions.     I explained to him that my contract facility-administered medications for this visit.

## 2020-12-15 ENCOUNTER — VIRTUAL VISIT (OUTPATIENT)
Dept: PSYCHOLOGY | Age: 35
End: 2020-12-15

## 2020-12-15 PROCEDURE — 99999 PR OFFICE/OUTPT VISIT,PROCEDURE ONLY: CPT | Performed by: PSYCHOLOGIST

## 2020-12-15 NOTE — PROGRESS NOTES
Behavioral Health Consultation/Psychotherapy Note  Annmarie Gallo. Phyllis Hernandez Psy.D. Visit Date:  12/15/2020    Patient:  Mar Grijalva  YOB: 1985  Chief Complaint:  Follow-up and Depression    Duration of session:  15 minutes      S:     This session was conducted as a telepsychology visit due to [facility] restrictions placed on in-person visits d/t the COVID-19 outbreak. Patient Location: Home       Provider Location ([facility]): [facility]    Patient gave verbal consent for teleservices and will sign a consent form when feasible. This virtual visit was conducted via interactive/real-time audio/video, using phone (audio only). Ct said he's doing about as well as can be expected. He denied any depressive thoughts or any anxiety, since he isn't going anywhere and isn't expected to. He hasn't been exercising at all. He hasn't felt much of a need to try and get a job. Overall, he said he feels very content with where he's at.        O:    Sleep disturbance No  Loss of pleasure Some  Speech    normal rate, normal volume, well articulated and clear and understandable  Mood    Dysthymic  Thought Process    linear, goal directed, coherent and linear and coherent  Insight    Fair  Judgment    Intact  Memory    recent and remote memory intact  Suicide Assessment    Denied SI, plans or intent    A:    1. Dysthymia    2. Major depressive disorder, recurrent episode, severe with anxious distress ([facility] [address]        Dysthymia seems to be part of his presentation with the MDD episodes coming and going.  Overall, ct is stable.  Will continue to encourage behavioral activation and use supportive counseling. P:    Phone session in 3 mos. All questions about treatment plan answered. Patient instructed to go immediately to the emergency room and/or call 911 if any suicidal or homicidal ideations. Patient stated understanding and is agreeable to treatment and crisis plan. Provider Signature:  Electronically signed by Jordan Santos PSYD on 12/15/2020 at 1:13 PM

## 2021-01-11 ENCOUNTER — TELEPHONE (OUTPATIENT)
Dept: FAMILY MEDICINE CLINIC | Age: 36
End: 2021-01-11

## 2021-01-11 NOTE — TELEPHONE ENCOUNTER
Patient's mother contacted the [facility] about doing a VV for her son Taran Patel. Patient has some Mental Health Issues and is unable to call in for self. He has not been seen in office since 2017, current symptoms are cough and feeling like something is caught in his throat. Attempted to contact office to inquire if he can schedule a VV, or if he would need to be re established as a patient.        Please contact patients mother - Summer King

## 2021-02-08 ENCOUNTER — OFFICE VISIT (OUTPATIENT)
Dept: FAMILY MEDICINE CLINIC | Age: 36
End: 2021-02-08
Payer: COMMERCIAL

## 2021-02-08 VITALS
WEIGHT: 315 LBS | DIASTOLIC BLOOD PRESSURE: 78 MMHG | HEIGHT: 71 IN | TEMPERATURE: 96.8 F | OXYGEN SATURATION: 98 % | BODY MASS INDEX: 44.1 KG/M2 | SYSTOLIC BLOOD PRESSURE: 132 MMHG | HEART RATE: 120 BPM

## 2021-02-08 DIAGNOSIS — F33.1 MODERATE EPISODE OF RECURRENT MAJOR DEPRESSIVE DISORDER (HCC): ICD-10-CM

## 2021-02-08 DIAGNOSIS — F41.1 GAD (GENERALIZED ANXIETY DISORDER): ICD-10-CM

## 2021-02-08 DIAGNOSIS — F31.89 OTHER BIPOLAR DISORDER (HCC): ICD-10-CM

## 2021-02-08 DIAGNOSIS — G47.30 SLEEP APNEA, UNSPECIFIED TYPE: ICD-10-CM

## 2021-02-08 DIAGNOSIS — R09.89 FOREIGN BODY SENSATION IN THROAT: ICD-10-CM

## 2021-02-08 DIAGNOSIS — E66.01 MORBID OBESITY DUE TO EXCESS CALORIES (HCC): ICD-10-CM

## 2021-02-08 DIAGNOSIS — K21.9 GASTROESOPHAGEAL REFLUX DISEASE, UNSPECIFIED WHETHER ESOPHAGITIS PRESENT: Primary | ICD-10-CM

## 2021-02-08 PROCEDURE — G8484 FLU IMMUNIZE NO ADMIN: HCPCS | Performed by: NURSE PRACTITIONER

## 2021-02-08 PROCEDURE — 99214 OFFICE O/P EST MOD 30 MIN: CPT | Performed by: NURSE PRACTITIONER

## 2021-02-08 PROCEDURE — 1036F TOBACCO NON-USER: CPT | Performed by: NURSE PRACTITIONER

## 2021-02-08 PROCEDURE — G8417 CALC BMI ABV UP PARAM F/U: HCPCS | Performed by: NURSE PRACTITIONER

## 2021-02-08 PROCEDURE — G8427 DOCREV CUR MEDS BY ELIG CLIN: HCPCS | Performed by: NURSE PRACTITIONER

## 2021-02-08 RX ORDER — OMEPRAZOLE 20 MG/1
20 CAPSULE, DELAYED RELEASE ORAL
Qty: 30 CAPSULE | Refills: 2 | Status: SHIPPED | OUTPATIENT
Start: 2021-02-08 | End: 2021-03-09

## 2021-02-08 NOTE — PATIENT INSTRUCTIONS
You may receive a survey about your visit with us today. The feedback from our patients helps us identify what is working well and where the service to all patients can be enhanced. Thank you! Patient Education        omeprazole  Pronunciation:  oh MEP ra zol  Brand:  FIRST Omeprazole, Omeprazole + SyrSpend SF Ana, PriLOSEC, PriLOSEC OTC  What is the most important information I should know about omeprazole? Omeprazole can cause kidney problems. Tell your doctor if you are urinating less than usual, or if you have blood in your urine. Diarrhea may be a sign of a new infection. Call your doctor if you have diarrhea that is watery or has blood in it. Omeprazole may cause new or worsening symptoms of lupus. Tell your doctor if you have joint pain and a skin rash on your cheeks or arms that worsens in sunlight. You may be more likely to have a broken bone while taking this medicine long term or more than once per day. What is omeprazole? Omeprazole is a proton pump inhibitor that decreases the amount of acid produced in the stomach. Omeprazole is used to treat symptoms of gastroesophageal reflux disease (GERD) and other conditions caused by excess stomach acid. Omeprazole is also used to promote healing of erosive esophagitis (damage to your esophagus caused by stomach acid). Omeprazole may also be given together with antibiotics to treat gastric ulcer caused by infection with Helicobacter pylori (H. pylori). Over-the-counter (OTC) omeprazole is used in adults to help control heartburn that occurs 2 or more days per week. This medicine not for immediate relief of heartburn symptoms. OTC omeprazole must be taken on a regular basis for 14 days in a row. Omeprazole may also be used for purposes not listed in this medication guide. What should I discuss with my healthcare provider before taking omeprazole? Heartburn can mimic early symptoms of a heart attack. Get emergency medical help if you have chest pain that spreads to your jaw or shoulder and you feel sweaty or light-headed. You should not use omeprazole if you are allergic to it, or if:  · you are also allergic to medicines like omeprazole, such as esomeprazole, lansoprazole, pantoprazole, rabeprazole, Nexium, Prevacid, Protonix, and others; or  · you also take HIV medication that contains rilpivirine (such as Ciarra Murillo, Jack Sellers). Ask a doctor or pharmacist if this medicine is safe to use if you have:  · trouble or pain with swallowing;  · bloody or black stools, vomit that looks like blood or coffee grounds;  · heartburn that has lasted for over 3 months;  · frequent chest pain, heartburn with wheezing;  · unexplained weight loss;  · nausea or vomiting, stomach pain;  · liver disease;  · low levels of magnesium in your blood; or  · osteoporosis or low bone mineral density (osteopenia). You may be more likely to have a broken bone in your hip, wrist, or spine while taking a proton pump inhibitor long-term or more than once per day. Talk with your doctor about ways to keep your bones healthy. Ask a doctor before using this medicine if you are pregnant or breast-feeding. Do not give this medicine to a child without medical advice. How should I take omeprazole? Follow all directions on your prescription label and read all medication guides or instruction sheets. Use the medicine exactly as directed. Use Prilosec OTC (over-the-counter) exactly as directed on the label, or as prescribed by your doctor. Read and carefully follow any Instructions for Use provided with your medicine. Ask your doctor or pharmacist if you do not understand these instructions. Shake the oral suspension (liquid) before you measure a dose. Use the dosing syringe provided, or use a medicine dose-measuring device (not a kitchen spoon). If you cannot swallow a capsule whole, open it and sprinkle the medicine into a spoonful of applesauce. Swallow the mixture right away without chewing. Do not save it for later use. You must dissolve omeprazole powder in a small amount of water. This mixture can either be swallowed or given through a nasogastric (NG) feeding tube using a catheter-tipped syringe. Use this medicine for the full prescribed length of time, even if your symptoms quickly improve. OTC omeprazole should be taken for only 14 days in a row. It may take 1 to 4 days before your symptoms improve. Allow at least 4 months to pass before you start a new 14-day course of treatment. Call your doctor if your symptoms do not improve, or if they get worse. Some conditions are treated with a combination of omeprazole and antibiotics. Use all medications as directed. This medicine can affect the results of certain medical tests. Tell any doctor who treats you that you are using omeprazole. Store at room temperature away from moisture and heat. What happens if I miss a dose? Take the medicine as soon as you can, but skip the missed dose if it is almost time for your next dose. Do not take two doses at one time. What happens if I overdose? Seek emergency medical attention or call the Poison Help line at 1-805.372.7676. What should I avoid while taking omeprazole? This medicine can cause diarrhea, which may be a sign of a new infection. If you have diarrhea that is watery or bloody, call your doctor before using anti-diarrhea medicine. What are the possible side effects of omeprazole? Get emergency medical help if you have signs of an allergic reaction: hives; difficulty breathing; swelling of your face, lips, tongue, or throat.   Stop using omeprazole and call your doctor at once if you have:  · severe stomach pain, diarrhea that is watery or bloody;  · new or unusual pain in your wrist, thigh, hip, or back;  · seizure (convulsions); · kidney problems --little or no urination, blood in your urine, swelling, rapid weight gain;  · low magnesium --dizziness, irregular heartbeats, feeling jittery, muscle cramps, muscle spasms, cough or choking feeling; or  · new or worsening symptoms of lupus --joint pain, and a skin rash on your cheeks or arms that worsens in sunlight. Taking omeprazole long-term may cause you to develop stomach growths called fundic gland polyps. Talk with your doctor about this risk. If you use omeprazole for longer than 3 years, you could develop a vitamin B-12 deficiency. Talk to your doctor about how to manage this condition if you develop it. Common side effects may include:  · stomach pain, gas;  · nausea, vomiting, diarrhea; or  · headache. This is not a complete list of side effects and others may occur. Call your doctor for medical advice about side effects. You may report side effects to FDA at 5-514-FDA-5760. What other drugs will affect omeprazole? Sometimes it is not safe to use certain medications at the same time. Some drugs can affect your blood levels of other drugs you take, which may increase side effects or make the medications less effective. Tell your doctor about all your current medicines. Many drugs can affect omeprazole, especially:  · clopidogrel;  · methotrexate;  · [place]'s wort; or  · an antibiotic --amoxicillin, clarithromycin, rifampin. This list is not complete and many other drugs may affect omeprazole. This includes prescription and over-the-counter medicines, vitamins, and herbal products. Not all possible drug interactions are listed here. Where can I get more information? Your pharmacist can provide more information about omeprazole. Remember, keep this and all other medicines out of the reach of children, never share your medicines with others, and use this medication only for the indication prescribed. Every effort has been made to ensure that the information provided by [address] is accurate, up-to-date, and complete, but no guarantee is made to that effect. Drug information contained herein may be time sensitive. [facility] information has been compiled for use by healthcare practitioners and consumers in the [place] and therefore [facility] does not warrant that uses outside of the [place] are appropriate, unless specifically indicated otherwise. [facility]'s drug information does not endorse drugs, diagnose patients or recommend therapy. [facility]'s drug information is an informational resource designed to assist licensed healthcare practitioners in caring for their patients and/or to serve consumers viewing this service as a supplement to, and not a substitute for, the expertise, skill, knowledge and judgment of healthcare practitioners. The absence of a warning for a given drug or drug combination in no way should be construed to indicate that the drug or drug combination is safe, effective or appropriate for any given patient. [facility] does not assume any responsibility for any aspect of healthcare administered with the aid of information [facility] provides. The information contained herein is not intended to cover all possible uses, directions, precautions, warnings, drug interactions, allergic reactions, or adverse effects. If you have questions about the drugs you are taking, check with your doctor, nurse or pharmacist.  Copyright 7703-6760 [address]: 20.01. Revision date: 4/11/2019. Care instructions adapted under license by [facility] ([facility]). If you have questions about a medical condition or this instruction, always ask your healthcare professional. [place] [address] any warranty or liability for your use of this information.

## 2021-02-08 NOTE — PROGRESS NOTES
Chief Complaint   Patient presents with    [facility] hx, acid reflux       SUBJECTIVE     Tonio Lakhani is a 30 y.o.male      Pt presents to establish PCP- previous physician was Dr Brenton Carlson. Current medical problems include:  Patient Active Problem List   Diagnosis    Depression    ADHD (attention deficit hyperactivity disorder)    Obesity    Morbid obesity due to excess calories (HCC)    Snoring    Fatigue    Daytime sleepiness    WATSON (generalized anxiety disorder)    Other bipolar disorder ([address]    ADHD, adult residual type     Pt follows with Psychiatry and psychology. Pt notes lots of issues with acid reflux. He will wake up with acid in his throat and coughing. He questions if it is messing up his throat. He states he can feel something in his throat. Sometimes has trouble swallowing foods. He is now elevating when he sleeps and this helps. Pt does snore at night. He has been diagnosed with sleep apnea and tried to get adjusted with a cpap but states it would give him anxiety issues so he does not use one. Pt has a sensation of something in his throat. It bothers him most in the evening. It does not cause him to choke, but does irritate him. Had T&A as a child.      Past Medical History:   Diagnosis Date    ADHD (attention deficit hyperactivity disorder) 57    Anxiety     Depression 2000    Obesity 1990's       Past Surgical History:   Procedure Laterality Date    ADENOIDECTOMY  1988    TONSILLECTOMY  1988       No Known Allergies       Current Outpatient Medications:     omeprazole (PRILOSEC) 20 MG delayed release capsule, Take 1 capsule by mouth every morning (before breakfast), Disp: 30 capsule, Rfl: 2    QUEtiapine (SEROQUEL) 100 MG tablet, TAKE 1 TABLET BY MOUTH IN THE EVENING, Disp: 30 tablet, Rfl: 5    vilazodone HCl (VIIBRYD) 40 MG TABS, Take one  tablet every morning MUST TAKE WITH FOOD TO ACTIVATE, Disp: 30 tablet, Rfl: 5 Effort: Pulmonary effort is normal.      Breath sounds: Normal breath sounds. Abdominal:      General: Bowel sounds are normal.      Palpations: Abdomen is soft. Musculoskeletal: Normal range of motion. Skin:     General: Skin is warm and dry. Neurological:      Mental Status: He is alert and oriented to person, place, and time. Deep Tendon Reflexes: Reflexes are normal and symmetric. Psychiatric:         Behavior: Behavior normal.         Thought Content: Thought content normal.         Judgment: Judgment normal.           Immunization History   Administered Date(s) Administered    Influenza Virus Vaccine 10/20/2014    Influenza, Sheryle Rude, IM, (6 mo and older Fluzone, Flulaval, Fluarix and 3 yrs and older Afluria) 02/14/2017, 10/27/2017    Tdap (Boostrix, Adacel) 10/20/2014         Health Maintenance   Topic Date Due    Varicella vaccine (1 of 2 - 2-dose childhood series) 06/10/1987    Flu vaccine (1) 09/01/2020    DTaP/Tdap/Td vaccine (2 - Td) 10/20/2024    Hepatitis A vaccine  Aged Out    Hepatitis B vaccine  Aged Out    Hib vaccine  Aged Out    Meningococcal (ACWY) vaccine  Aged Out    Pneumococcal 0-64 years Vaccine  Aged Out    Hepatitis C screen  Discontinued    HIV screen  Discontinued         ASSESSMENT       Diagnosis Orders   1. Gastroesophageal reflux disease, unspecified whether esophagitis present     2. Foreign body sensation in throat  Dora Sales MD, Otolaryngology, [facility]   3. WATSON (generalized anxiety disorder)     4. Other bipolar disorder ([facility]     5. Morbid obesity due to excess calories ([facility]     6. Moderate episode of recurrent major depressive disorder ([facility]     7.  Sleep apnea, unspecified type             PLAN       Requested Prescriptions     Signed Prescriptions Disp Refills    omeprazole (PRILOSEC) 20 MG delayed release capsule 30 capsule 2     Sig: Take 1 capsule by mouth every morning (before breakfast)       Orders Placed This Encounter Ponce Whittaker MD, Otolaryngology, [facility] [address]     Referral Priority:   Routine     Referral Type:   Eval and Treat     Referral Reason:   Specialty Services Required     Referred to Provider:   Amanda Mclaughlin MD     Requested Specialty:   Otolaryngology     Number of Visits Requested:   1     Pt declines routine labs at this time  Referral to Dr John Gonzalez with ENT  Prilose sent to pharmacy for reflux  Follow up in 3 months if stable and feeling well       Electronically signed by CHARITO Tejeda CNP on 2/12/2021 at 3:12 PM

## 2021-02-12 ASSESSMENT — ENCOUNTER SYMPTOMS
EYES NEGATIVE: 1
CHEST TIGHTNESS: 0
ABDOMINAL PAIN: 0
BLOOD IN STOOL: 0
SHORTNESS OF BREATH: 0
CHOKING: 1
COUGH: 1

## 2021-03-04 ENCOUNTER — VIRTUAL VISIT (OUTPATIENT)
Dept: PSYCHIATRY | Age: 36
End: 2021-03-04
Payer: COMMERCIAL

## 2021-03-04 DIAGNOSIS — F41.1 GAD (GENERALIZED ANXIETY DISORDER): ICD-10-CM

## 2021-03-04 DIAGNOSIS — E66.01 MORBID OBESITY WITH BODY MASS INDEX OF 60.0-69.9 IN ADULT (HCC): ICD-10-CM

## 2021-03-04 DIAGNOSIS — F31.75 BIPOLAR DISORDER, IN PARTIAL REMISSION, MOST RECENT EPISODE DEPRESSED (HCC): Primary | ICD-10-CM

## 2021-03-04 PROCEDURE — 99214 OFFICE O/P EST MOD 30 MIN: CPT | Performed by: REGISTERED NURSE

## 2021-03-04 PROCEDURE — 1036F TOBACCO NON-USER: CPT | Performed by: REGISTERED NURSE

## 2021-03-04 PROCEDURE — G8417 CALC BMI ABV UP PARAM F/U: HCPCS | Performed by: REGISTERED NURSE

## 2021-03-04 PROCEDURE — G8484 FLU IMMUNIZE NO ADMIN: HCPCS | Performed by: REGISTERED NURSE

## 2021-03-04 PROCEDURE — G8428 CUR MEDS NOT DOCUMENT: HCPCS | Performed by: REGISTERED NURSE

## 2021-03-04 NOTE — PROGRESS NOTES
This note will not be viewable in [employer] for the following reason(s). This is a Psychotherapy Note. [address] [address] [address] [address]  Dept: [phone]  Dept Fax: [phone]  Loc: [phone]    Visit Date: 3/4/2021    SUBJECTIVE DATA     CHIEF COMPLAINT:    Chief Complaint   Patient presents with    Follow-up       History obtained from: patient    HISTORY OF PRESENT ILLNESS:    Mak Schulz is a 30 y.o. male who presents to the office for a follow-up appointment. He is a transfer patient from Dr. Jaron Diego and a new patient to this provider. VLAD Bhandari reports he is doing \"pretty good\" since he last saw Dr. Jaron Diego in November 2020. He does not have any complaints at this time. Reports he has been spending most of his time online. Denies thoughts to harm self  Denies homicidal ideations  Denies hallucinations  Denies delusional thinking  Denies manic symptoms      Medications  Viibryd 40mg--verified patient is taking with food   Lamictal 200mg  Seroquel 100mg in the evening      PSYCHIATRIC HISTORY:  Patient has had prior care with the following:    [x] Psychiatrist--was following with Dr Jaron Diego prior to this provider    [x] Psychologist--seeing Dr. Jess Barrow     [] Other Therapist    [] None    Bipolar disorder  Reports his moods tend to be more depressive. Mostly he is struggling with mood symptoms. He has depressed and down days when he says he cannot get himself out of bed. He has no energy, no motivation, feels hopeless and pointless. He can be like this for a few days at a time and it occurs at least weekly if not more often. He does not feel that he gets like this unless he has nothing to do.   However he is not working and does not leave the home much, so he is at loose ends most of the time.    Substance and Sexual Activity    Alcohol use: No    Drug use: No    Sexual activity: Not Currently     Partners: Female   Lifestyle    Physical activity     Days per week: Not on file     Minutes per session: Not on file    Stress: Not on file   Relationships    Social connections     Talks on phone: Not on file     Gets together: Not on file     Attends Jehovah's witness service: Not on file     Active member of club or organization: Not on file     Attends meetings of clubs or organizations: Not on file     Relationship status: Not on file    Intimate partner violence     Fear of current or ex partner: Not on file     Emotionally abused: Not on file     Physically abused: Not on file     Forced sexual activity: Not on file   Other Topics Concern    Not on file   Social History Narrative    Not on file       FAMILY HISTORY:   Family History   Problem Relation Age of Onset    Arthritis Mother     Alcohol Abuse Father         hx of alcoholism    Arthritis Maternal Aunt     Stroke Maternal Grandmother [de-identified]    Stroke Paternal Grandmother     COPD Paternal Grandfather        Psychiatric Family History  denies    PAST MEDICAL HISTORY:    Past Medical History:   Diagnosis Date    ADHD (attention deficit hyperactivity disorder) 57    Anxiety     Depression 2000    Obesity 1990's       PAST SURGICAL HISTORY:    Past Surgical History:   Procedure Laterality Date   [facility]:  Outpatient Medications Prior to Visit   Medication Sig Dispense Refill    omeprazole (PRILOSEC) 20 MG delayed release capsule Take 1 capsule by mouth every morning (before breakfast) 30 capsule 2    QUEtiapine (SEROQUEL) 100 MG tablet TAKE 1 TABLET BY MOUTH IN THE EVENING 30 tablet 5    vilazodone HCl (VIIBRYD) 40 MG TABS Take one  tablet every morning MUST TAKE WITH FOOD TO ACTIVATE 30 tablet 5    lamoTRIgine (LAMICTAL) 200 MG tablet Take 1 tablet by mouth daily 30 tablet 5 No facility-administered medications prior to visit. ALLERGIES:    Patient has no known allergies. REVIEW OF SYSTEMS:    Review of Systems    The patient sees CHARITO Montalvo CNP as his primary care provider. SPECIALISTS: none    OBJECTIVE DATA     There were no vitals taken for this visit. Physical Exam    Mental Status Evaluation:   Orientation: Alert, oriented, thought content appropriate   Mood:. Within Normal Limits      Affect:  Normal      Appearance:  Casually Dressed, Overweight, Within Normal Limits, Clean and Well Groomed   Activity:  Within Normal Limits, Cooperative, Good Eye Contact and Seated Calmly   Gait/Posture: Normal   Speech:  Clear, Fluent, Normal Pitch and Volume, Age and Situation Appropriate   Thought Process: Within Normal Limits   Thought Content: Within Normal Limits   Cognition:  Grossly Intact   Memory: Intact   Insight:  Good   Judgment: Fair   Suicidal Ideations: Denies Suicidal Ideation   Homicidal Ideations: Negative for homicidal ideation   Medication Side Effects: Absent       Attention Span Attention span and concentration were age appropriate       Screenings Completed in This Encounter:     Anxiety and Depression:                   No flowsheet data found. Interpretation of WATSON-7 score: 5-9 = mild anxiety, 10-14 = moderate anxiety, 15+ = severe anxiety. Recommend referral to behavioral health for scores 10 or greater. DIAGNOSIS AND ASSESSMENT DATA     DIAGNOSIS:   1. Bipolar disorder, in partial remission, most recent episode depressed ([facility]    2. WATSON (generalized anxiety disorder)    3. Morbid obesity with body mass index of 60.0-69.9 in adult [facility])        PLAN   Follow-up:  Return in about 6 months (around 9/4/2021), or if symptoms worsen or fail to improve, for medication management, follow-up.     1. Continue medications as prescribed    Prescriptions for this encounter:  New Prescriptions    No medications on file No orders of the defined types were placed in this encounter. There are no discontinued medications. Additional orders:  No orders of the defined types were placed in this encounter. Risks, potential side effects, possibledrug-drug interactions, benefits and alternate treatments discussed in detail. All questions answered. Patient stated understanding and is agreeable to treatment plan. Patient has been instructed to seek emergency help via the emergency and/or calling 911 should symptoms become severe, worsen, or with other concerning symptoms. Patient instructed to goimmediately to the emergency room and/or call 911 with any suicidal or homicidal ideations or if audio/visual hallucinations develop  Patient stated understanding and agrees. Patient given crisis center information. I spent a total of 60 minutes with the patient and over half of that time was spent on counselingand coordination of care regarding topics discussed above. Provider Signature:  Electronically signed by CHARITO Wilson CNP on 3/4/2021 at 4:59 PM    Laurie Landry, was evaluated through a synchronous (real-time) audio-video encounter. The patient (or guardian if applicable) is aware that this is a billable service. Verbal consent to proceed has been obtained within the past 12 months. The visit was conducted pursuant to the emergency declaration under the [address] [place] [address], [address] [place] [address] authority and the [email or web address] and [employer] General Act. Patient identification was verified, and a caregiver was present when appropriate. The patient was located in a state where the provider was credentialed to provide care. --CHARITO Wilson CNP on 3/4/2021 at 4:59 PM    An electronic signature was used to authenticate this note.

## 2021-03-09 ENCOUNTER — OFFICE VISIT (OUTPATIENT)
Dept: ENT CLINIC | Age: 36
End: 2021-03-09
Payer: COMMERCIAL

## 2021-03-09 VITALS
WEIGHT: 315 LBS | TEMPERATURE: 97.3 F | DIASTOLIC BLOOD PRESSURE: 98 MMHG | RESPIRATION RATE: 18 BRPM | HEIGHT: 71 IN | SYSTOLIC BLOOD PRESSURE: 146 MMHG | HEART RATE: 108 BPM | OXYGEN SATURATION: 97 % | BODY MASS INDEX: 44.1 KG/M2

## 2021-03-09 DIAGNOSIS — J34.89 NASAL VALVE COLLAPSE: ICD-10-CM

## 2021-03-09 DIAGNOSIS — E66.01 MORBID OBESITY (HCC): ICD-10-CM

## 2021-03-09 DIAGNOSIS — K21.9 GASTROESOPHAGEAL REFLUX DISEASE WITHOUT ESOPHAGITIS: ICD-10-CM

## 2021-03-09 DIAGNOSIS — J34.89 NASAL OBSTRUCTION: ICD-10-CM

## 2021-03-09 DIAGNOSIS — J02.9 REFLUX PHARYNGITIS: ICD-10-CM

## 2021-03-09 DIAGNOSIS — R09.89 FOREIGN BODY SENSATION IN THROAT: Primary | ICD-10-CM

## 2021-03-09 DIAGNOSIS — G47.33 OBSTRUCTIVE SLEEP APNEA: ICD-10-CM

## 2021-03-09 DIAGNOSIS — R06.5 CHRONIC MOUTH BREATHING: ICD-10-CM

## 2021-03-09 PROCEDURE — 99205 OFFICE O/P NEW HI 60 MIN: CPT | Performed by: OTOLARYNGOLOGY

## 2021-03-09 PROCEDURE — G8417 CALC BMI ABV UP PARAM F/U: HCPCS | Performed by: OTOLARYNGOLOGY

## 2021-03-09 PROCEDURE — G8427 DOCREV CUR MEDS BY ELIG CLIN: HCPCS | Performed by: OTOLARYNGOLOGY

## 2021-03-09 PROCEDURE — G8484 FLU IMMUNIZE NO ADMIN: HCPCS | Performed by: OTOLARYNGOLOGY

## 2021-03-09 PROCEDURE — 1036F TOBACCO NON-USER: CPT | Performed by: OTOLARYNGOLOGY

## 2021-03-09 RX ORDER — LIDOCAINE 50 MG/G
OINTMENT TOPICAL
Qty: 1 TUBE | Refills: 5 | Status: SHIPPED | OUTPATIENT
Start: 2021-03-09 | End: 2021-09-14

## 2021-03-09 RX ORDER — FAMOTIDINE 40 MG/1
40 TABLET, FILM COATED ORAL 2 TIMES DAILY
Qty: 180 TABLET | Refills: 3 | Status: SHIPPED | OUTPATIENT
Start: 2021-03-09 | End: 2021-09-14

## 2021-03-09 RX ORDER — LIDOCAINE HYDROCHLORIDE 20 MG/ML
5 SOLUTION OROPHARYNGEAL PRN
Qty: 1 BOTTLE | Refills: 3
Start: 2021-03-09 | End: 2021-04-08

## 2021-03-09 RX ORDER — OMEPRAZOLE 40 MG/1
40 CAPSULE, DELAYED RELEASE ORAL
Qty: 180 CAPSULE | Refills: 5 | Status: SHIPPED | OUTPATIENT
Start: 2021-03-09 | End: 2022-08-02 | Stop reason: SDUPTHER

## 2021-03-09 NOTE — PROGRESS NOTES
[facility] [place] [facility]  [facility] E[facility] NOSE AND TH[facility]  [facility]  Dept: [phone]  Dept Fax: [phone]  Loc: [phone]    Joanne Richter is a 30 y.o. male who was referred by CHARITO Lopez * for:  Chief Complaint   Patient presents with    New Patient     New patient is here for foreign body sensation in throat. Referred by Danisha MCNEILL-AC. HPI:     Joanne Richter is a 30 y.o. male referred for evaluation of a chronic foreign body sensation that is worsened significantly over the past several months. He has had something of this problem for probably the last couple of years. During that time interval his tendency towards recumbency related extra esophageal reflux bordering on regurgitant backflow fluids from his stomach, was first apparent to him. He says over the past 4 to 5 months he has noticed an area in the back of his throat that feels like an ulcer or tuft of tissue that is somewhere in in the midline low in his throat and very uncomfortable. He has nighttime breathing problems diagnosed as severe sleep apnea approximately 1 year ago. He was treated with CPAP that he describes as \"horrible\". He was placed under such high pressure (he cannot actually remember the number) that he felt as though he could not exhale into the machine. He basically used it for very short interval and then returned. He considers it worse than his sleep apnea disease. He has had no barium swallow evaluation to evaluate his reflux tendency and no fiberoptic laryngoscopy. He is a never smoker nondrinker. He has no personal or family history of head neck cancer. His parental grandfather  of lung cancer in one maternal uncle  of lung cancer. The patient is morbidly obese.   He has looked into the possibility of bariatric surgery but states that based on his research he should consider the operation for treatment negative, except as noted in HPI. Objective:     BP (!) 146/98 (Site: Left Lower Arm, Position: Sitting, Cuff Size: Large Adult)   Pulse 108   Temp 97.3 °F (36.3 °C) (Infrared)   Resp 18   Ht 5' 11\" (1.803 m)   Wt (!) 456 lb (206.8 kg)   SpO2 97%   BMI 63.60 kg/m²     Physical Exam       On general physical exam the patient is a pleasant somewhat somnolent morbidly obese young adult male in no acute distress. His speech pattern and his voice are both within normal limits for his age and gender. I heard no throat clearing coughing or inspiratory stridor. On otoscopy the patient's middle ear structures, tympanic membranes, ear canals, and pinna were bilaterally within normal limits. His nose was abnormal for the presence of mild right-sided nasal septal deviation with bilateral turbinate hypertrophy. No polyps were seen. No mucopurulence was seen. He had positive valving of both nostrils. His facial nerve was grossly and symmetrically intact. His oral cavity and oropharynx were abnormal for the presence of a class I-II Mallampati aperture. His dentition was in good condition. His tongue was fully mobile. He had no mucosal lesions. His neck was obese and difficult to palpate. I detected no adenopathy or thyromegaly but neither could I exclude these conditions. His lungs were clear to auscultation throughout. His heart had distant heart tones but appeared to have a regular rate and rhythm without murmur or gallop. His abdomen was protuberant but soft and nontender. His upper extremities had 2+ pulses, 1+ symmetrical strength and normal capillary refill. His lower extremities were obese with 3+ pitting edema to his upper calfs. Vitals reviewed. No results found.    No results found for: NA, K, CL, CO2, BUN, CREATININE, CALCIUM, PROT, LABALBU, BILITOT, ALKPHOS, AST, ALT    All of the past medical history, past surgical history, family history,social history, allergies and current medications were reviewed with the patient. Assessment & Plan   Diagnoses and all orders for this visit:     Diagnosis Orders   1. Foreign body sensation in throat     2. Reflux pharyngitis     3. Morbid obesity ([address]     4. Gastroesophageal reflux disease without esophagitis     5. Obstructive sleep apnea      Noncompliant with CPAP   6. Chronic mouth breathing     7. Nasal obstruction     8. Nasal valve collapse         Based on the patient's history and these physical findings, most of his problems relate to his morbid obesity. His foreign body sensation is likely a manifestation of chronic extra esophageal reflux with chronic mucositis. He may have an ulcer. He may also have a more concerning lesion but I recommended against fiberoptic endoscopy right now before I can treat his reflux tendency for a few weeks to \"cool down\" the reflux pharyngitis he is likely to have. To this and I recommended that he adopt a \"5C's\" diet avoiding caffeine and carbonation as well as chocolate and citrus assiduously for these next several months. Fortunately he does not drink or smoke. I will also start him on two forms of acid suppression increasing his omeprazole to a pharmacological 40 mg and prescribing it twice daily 30 minutes before breakfast and 30 minutes before his evening meal.  In addition I will get him on twice daily Pepcid 40 mg to thoroughly suppress his gastric acidity as much as possible. He has to lose weight. I reviewed with him the possibility of bariatric surgery which he is apparently completely uninterested in. Nonetheless I recommended a weight loss program that would include eating three meals a day with half portions of his normal starch intake. He is to eat no desserts and refrain from any form of snacking. He is to have a single portion per meal with no seconds. He is to go to bed hungry having been n.p.o. for at least 4 hours before recumbency.   If he is able to adopt even a part of

## 2021-03-12 ENCOUNTER — TELEPHONE (OUTPATIENT)
Dept: ENT CLINIC | Age: 36
End: 2021-03-12

## 2021-03-15 NOTE — TELEPHONE ENCOUNTER
Appointment canceled for Eve Castillo ([id number])   Visit Type: OFFICE VISIT   Date        Time      Length    Provider                  Department   6/9/2021     2:00 PM  30 mins.  Rishabh MARMOLEJO MD                [facility]      Reason for Cancellation: Patient preference      Patient Comments: Unsatisfied with previous visit, was focused on my weight instead of the problem I came in for, left me with no lazaro that my problems were being taken seriously.

## 2021-03-16 ENCOUNTER — VIRTUAL VISIT (OUTPATIENT)
Dept: PSYCHOLOGY | Age: 36
End: 2021-03-16
Payer: COMMERCIAL

## 2021-03-16 DIAGNOSIS — F33.2 MAJOR DEPRESSIVE DISORDER, RECURRENT EPISODE, SEVERE WITH ANXIOUS DISTRESS (HCC): ICD-10-CM

## 2021-03-16 DIAGNOSIS — F34.1 DYSTHYMIA: Primary | ICD-10-CM

## 2021-03-16 PROCEDURE — 90832 PSYTX W PT 30 MINUTES: CPT | Performed by: PSYCHOLOGIST

## 2021-03-16 NOTE — TELEPHONE ENCOUNTER
Attempted to call patient's mom in regards to the trumpet. If the patient does not want to see [facility]. [facility] for a follow up then the trumpet will not be filled because the patient would need a follow up to get fit and explained how to use it. Left message to give office a call back.

## 2021-03-16 NOTE — TELEPHONE ENCOUNTER
Patient's mom called back and was informed that if he does not want a follow up the trumpet will not be filled since Dr. John Gonzalez would want the patient to have a follow up to explain how to use it and fit it. Patient's mom verbalized understanding.

## 2021-03-16 NOTE — PROGRESS NOTES
Behavioral Health Consultation/Psychotherapy Note  Leopoldo Needy. Clemencia Flores Psy.D. Visit Date:  3/16/2021    Patient:  Vannesa Watkins  YOB: 1985  Chief Complaint:  Follow-up, Depression, and Stress    Duration of session:  30 minutes      S:     This session was conducted as a telepsychology visit due to [facility] restrictions placed on in-person visits d/t the COVID-19 outbreak. Patient Location: Home       Provider Location ([facility]): [facility]    Patient gave verbal consent for teleservices and will sign a consent form when feasible. This virtual visit was conducted via interactive/real-time audio/video, using phone (audio only). Ct said he was distressed from a recent doctor's appt. He said he saw an ENT about a growth in his throat, but felt dismissed that the issue wasn't addressed, and was told to lose weight instead. He said he will be getting a second opinion from a different provider in a similar field instead. He talked about his frustration with this, and we processed this. He denied any SI, or depressive thoughts, just that he doesn't feel like his brain is working correctly, and that it's heavily medication related. O:    Sleep disturbance No  Loss of pleasure Some  Speech    normal rate, normal volume, well articulated and clear and understandable  Mood    Dysthymic  Thought Process    linear, goal directed, coherent and linear and coherent  Insight    Fair  Judgment    Intact  Memory    recent and remote memory intact  Suicide Assessment    Denied SI, plans or intent      A:    1. Dysthymia    2. Major depressive disorder, recurrent episode, severe with anxious distress ([facility] [address]        Dysthymia seems to be part of his presentation with the MDD episodes coming and going.  Overall, ct is stable.  Will continue to encourage behavioral activation and use supportive counseling. P:    Doxy visit in 3 mos. All questions about treatment plan answered. Patient

## 2021-04-08 RX ORDER — LAMOTRIGINE 200 MG/1
200 TABLET ORAL DAILY
Qty: 30 TABLET | Refills: 4 | Status: SHIPPED | OUTPATIENT
Start: 2021-04-08 | End: 2021-09-14 | Stop reason: SDUPTHER

## 2021-04-08 RX ORDER — VILAZODONE HYDROCHLORIDE 40 MG/1
TABLET ORAL
Qty: 30 TABLET | Refills: 4 | Status: SHIPPED | OUTPATIENT
Start: 2021-04-08 | End: 2021-09-14 | Stop reason: SDUPTHER

## 2021-04-08 NOTE — TELEPHONE ENCOUNTER
Natanael's mother called on his behalf to obtain a refill of Lamicatl 200mg/d and Viibryd 40mg/d. He attended an appointment 3/4 and is to return 9/2.

## 2021-06-08 ENCOUNTER — VIRTUAL VISIT (OUTPATIENT)
Dept: PSYCHOLOGY | Age: 36
End: 2021-06-08

## 2021-06-08 DIAGNOSIS — F33.2 MAJOR DEPRESSIVE DISORDER, RECURRENT EPISODE, SEVERE WITH ANXIOUS DISTRESS (HCC): ICD-10-CM

## 2021-06-08 DIAGNOSIS — F34.1 DYSTHYMIA: Primary | ICD-10-CM

## 2021-06-08 PROCEDURE — 99999 PR OFFICE/OUTPT VISIT,PROCEDURE ONLY: CPT | Performed by: PSYCHOLOGIST

## 2021-06-08 NOTE — PROGRESS NOTES
Behavioral Health Consultation/Psychotherapy Note  Adonica Deter. Saintclair Levine, Psy.D. Visit Date:  6/8/2021    Patient:  John Sloan  YOB: 1985  Chief Complaint:  Follow-up and Depression    Duration of session:  15 minutes      S:     This session was conducted as a telepsychology visit due to [facility] restrictions placed on in-person visits d/t the COVID-19 outbreak. Patient Location: Home       Provider Location ([facility]): [facility]    Patient gave verbal consent for teleservices and will sign a consent form when feasible. This virtual visit was conducted via interactive/real-time audio/video, using phone (audio only). Ct said he's doing pretty well. He said he had to stop the Seroquel due to the side effects. He's not sleeping as well b/c of it, but otherwise it's OK. He's been playing video games and watching videos online and feels good with this along with keeping in touch with friends online. He feels like stopping the Seroquel has increased his mental energy. He's done some exercise here and there but nothing consistent. He feels somewhat satisfied with where he's at today. O:    Sleep disturbance Some  Loss of pleasure Some  Speech    normal rate, normal volume, well articulated and clear and understandable  Mood    Dysthymic  Thought Process    linear, goal directed, coherent and linear and coherent  Insight    Fair  Judgment    Intact  Memory    recent and remote memory intact  Suicide Assessment    Denied SI, plans or intent    A:    1. Dysthymia    2. Major depressive disorder, recurrent episode, severe with anxious distress ([address]        Dysthymia seems to be part of his presentation with the MDD episodes coming and going.  Overall, ct is stable.  Will continue to encourage behavioral activation and use supportive counseling. P:    Meet again in 3 mos. All questions about treatment plan answered. Patient instructed to go immediately to the emergency room and/or call 911 if any suicidal or homicidal ideations. Patient stated understanding and is agreeable to treatment and crisis plan.           Provider Signature:  Electronically signed by Adah Canavan, PSYD on 6/8/2021 at 2:33 PM

## 2021-06-16 ENCOUNTER — TELEPHONE (OUTPATIENT)
Dept: PSYCHIATRY | Age: 36
End: 2021-06-16

## 2021-06-17 NOTE — TELEPHONE ENCOUNTER
As I have not previously seen the patient I cannot prescribe any new medications. Recommend he reach out to his PCP to discuss in the interim. Would consider referral to sleep medicine if the issue persists beyond 1 week duration.

## 2021-09-14 ENCOUNTER — OFFICE VISIT (OUTPATIENT)
Dept: PSYCHIATRY | Age: 36
End: 2021-09-14
Payer: COMMERCIAL

## 2021-09-14 VITALS — HEIGHT: 71 IN | BODY MASS INDEX: 44.1 KG/M2 | WEIGHT: 315 LBS

## 2021-09-14 DIAGNOSIS — E66.01 CLASS 3 SEVERE OBESITY WITH BODY MASS INDEX (BMI) OF 60.0 TO 69.9 IN ADULT, UNSPECIFIED OBESITY TYPE, UNSPECIFIED WHETHER SERIOUS COMORBIDITY PRESENT (HCC): ICD-10-CM

## 2021-09-14 DIAGNOSIS — F34.1 DYSTHYMIA: Primary | ICD-10-CM

## 2021-09-14 DIAGNOSIS — G47.00 INSOMNIA, UNSPECIFIED TYPE: ICD-10-CM

## 2021-09-14 DIAGNOSIS — F41.1 GENERALIZED ANXIETY DISORDER: ICD-10-CM

## 2021-09-14 PROCEDURE — 1036F TOBACCO NON-USER: CPT | Performed by: NURSE PRACTITIONER

## 2021-09-14 PROCEDURE — 90792 PSYCH DIAG EVAL W/MED SRVCS: CPT | Performed by: NURSE PRACTITIONER

## 2021-09-14 RX ORDER — LAMOTRIGINE 200 MG/1
200 TABLET ORAL DAILY
Qty: 30 TABLET | Refills: 2 | Status: SHIPPED | OUTPATIENT
Start: 2021-09-14 | End: 2021-11-30 | Stop reason: SDUPTHER

## 2021-09-14 RX ORDER — VILAZODONE HYDROCHLORIDE 40 MG/1
TABLET ORAL
Qty: 30 TABLET | Refills: 2 | Status: SHIPPED | OUTPATIENT
Start: 2021-09-14 | End: 2021-11-30

## 2021-09-14 NOTE — PROGRESS NOTES
while sleeping which he thinks is helpful  -states \"I don't have the energy in the first place. It's not that I wear out easily. \"    Has some situational anxiety. Worries about what may go wrong. States anxiety is stable. States \"I just kind of tolerate it. \" Reports anxiety has been worse in the past.    Report prior to Lamictal mood was much worse  -states states with the Lamictal \"I became a whole new functional person\"  -prior to Lamictal he would have \"breakdowns\" - crying, screaming, yelling, cursing, banging on doors/walls. These were \"complete breakdowns. \" This would happen once every 2 or 3 months; occasionally monthly.   -states these episodes would last an hour or less.  -the episodes would occur when he would feel overwhelmed   -states with the Lamictal \"I felt much more stable. \" - it helped with the depression as well as the irritability    Reports prior to current medication regimen his downs felt like \"no point in living at all. \" He felt like he was \"waiting to die sometimes. \" Had no desire to die or to complete suicide. Felt very hopeless and helpless and worthless. Had no motivation. Had some anhedonia as well. Things often felt tedious and he felt like he was pretending. These episodes would only last for a few days at a time but then he could find something that helped improve his mood. CHILDHOOD:  -He reports he was diagnosed with ADHD as a child; had some trouble with school. He recalls he was diagnosed with ADHD before his teenage years.   -was bullied before he was home schooled; states he was bullied due to his weight  -states he was home schooled \"because the schools didn't know how to handle someone with ADHD\"  -parents were  while he was growing up; they  when he was age 26  -he is the middle child, but has a twin sister and an older brother.  -felt loved growing up    Denies suicidal ideations, intent, plan. No homicidal ideations, intent, plan.  No audiovisual of Paying Living Expenses:    Food Insecurity:     Worried About [facility] in the Last Year:     [facility] in the Last Year:    Transportation Needs:     Lack of Transportation (Medical):      Lack of Transportation (Non-Medical):    Physical Activity:     Days of Exercise per Week:     Minutes of Exercise per Session:    Stress:     Feeling of Stress :    Social Connections:     Frequency of Communication with Friends and Family:     Frequency of Social Gatherings with Friends and Family:     Attends Protestant Services:     Active Member of Clubs or Organizations:     Attends Club or Organization Meetings:     Marital Status:    Intimate Partner Violence:     Fear of Current or Ex-Partner:     Emotionally Abused:     Physically Abused:     Sexually Abused:        FAMILY HISTORY:   Family History   Problem Relation Age of Onset    Arthritis Mother     Alcohol Abuse Father         hx of alcoholism    No Known Problems Sister     No Known Problems Brother     Arthritis Maternal Aunt     Stroke Maternal Grandmother [de-identified]    Stroke Paternal Grandmother     COPD Paternal Grandfather        Psychiatric Family History  As noted above    PAST MEDICAL HISTORY:    Past Medical History:   Diagnosis Date    ADHD (attention deficit hyperactivity disorder) 57    Anxiety     Depression 2000    Obesity 1990's       PAST SURGICAL HISTORY:    Past Surgical History:   Procedure Laterality Date    ADENOIDECTOMY  1988    TONSILLECTOMY  1988    WISDOM TOOTH EXTRACTION         PREVIOUSMEDICATIONS:  Outpatient Medications Prior to Visit   Medication Sig Dispense Refill    omeprazole (PRILOSEC) 40 MG delayed release capsule Take 1 capsule by mouth 2 times daily (before meals) 180 capsule 5    vilazodone HCl (VIIBRYD) 40 MG TABS Take one  tablet every morning MUST TAKE WITH FOOD TO ACTIVATE 30 tablet 4    lamoTRIgine (LAMICTAL) 200 MG tablet Take 1 tablet by mouth daily 30 tablet 4    famotidine (PEPCID) 40 MG tablet Take 1 tablet by mouth 2 times daily 180 tablet 3    lidocaine (XYLOCAINE) 5 % ointment Apply topically as needed to nasal trumpet. 1 Tube 5    QUEtiapine (SEROQUEL) 100 MG tablet TAKE 1 TABLET BY MOUTH IN THE EVENING 30 tablet 5     No facility-administered medications prior to visit. ALLERGIES:    Patient has no known allergies. REVIEW OF SYSTEMS:    Review of Systems    The patient sees CHARITO Odom CNP as his primary care provider. SPECIALISTS: None    OBJECTIVE DATA     Ht 5' 11\" (1.803 m)   Wt (!) 458 lb (207.7 kg)   BMI 63.88 kg/m²     Wt Readings from Last 3 Encounters:   09/14/21 (!) 458 lb (207.7 kg)   03/09/21 (!) 456 lb (206.8 kg)   02/08/21 (!) 457 lb (207.3 kg)        Physical Exam    Mental Status Evaluation:   Orientation: Alert, oriented, thought content appropriate   Mood:. Dysthymic      Affect:  Normal      Appearance:  morbidly obese, Age Appropriate, Casually Dressed, Clean, Well Groomed, Clothing Appropriate for Age and Clothing Appropriate for Weather   Activity:  Cooperative, Good Eye Contact and Seated Calmly   Gait/Posture: Normal   Speech:  Clear, Fluent, Normal Pitch and Volume, Age and Situation Appropriate   Thought Process: Within Normal Limits   Thought Content: Within Normal Limits   Cognition:  Grossly Intact   Memory: Intact   Insight:  Good   Judgment: Good   Suicidal Ideations: Denies Suicidal Ideation   Homicidal Ideations: Negative for homicidal ideation   Medication Side Effects: Absent       Attention Span Attention span and concentration were age appropriate       Screenings Completed in This Encounter:     Anxiety and Depression:                    DIAGNOSIS AND ASSESSMENT DATA     DIAGNOSIS:   1. Dysthymia    2. Generalized anxiety disorder    3. Insomnia, unspecified type    4.  Class 3 severe obesity with body mass index (BMI) of 60.0 to 69.9 in adult, unspecified obesity type, unspecified whether serious spent on counseling and coordination of care regarding topics discussed above. Provider Signature:  Electronically signed by CHARITO Gold CNP on 9/14/2021 at 2:47 PM    **This report has been created using voice recognition software. It may contain minor errors which are inherent in voice recognition technology. **

## 2021-09-15 ENCOUNTER — OFFICE VISIT (OUTPATIENT)
Dept: PSYCHOLOGY | Age: 36
End: 2021-09-15
Payer: COMMERCIAL

## 2021-09-15 DIAGNOSIS — F33.2 MAJOR DEPRESSIVE DISORDER, RECURRENT EPISODE, SEVERE WITH ANXIOUS DISTRESS (HCC): ICD-10-CM

## 2021-09-15 DIAGNOSIS — F34.1 DYSTHYMIA: Primary | ICD-10-CM

## 2021-09-15 PROCEDURE — 1036F TOBACCO NON-USER: CPT | Performed by: PSYCHOLOGIST

## 2021-09-15 PROCEDURE — 90834 PSYTX W PT 45 MINUTES: CPT | Performed by: PSYCHOLOGIST

## 2021-09-15 NOTE — PATIENT INSTRUCTIONS
What is mental health or emotional health? Mental or emotional health refers to your overall psychological well-being. It includes the way you feel about yourself, the quality of your relationships, and your ability to manage your feelings and deal with difficulties. Good mental health isn't just the absence of mental health problems. Being mentally or emotionally healthy is much more than being free of depression, anxiety, or other psychological issues. Rather than the absence of mental illness, mental and emotional health refers to the presence of positive characteristics. Similarly, not feeling bad is not the same as feeling good. While some people may not have negative feelings, they still need to do things that make them feel positive in order to achieve mental and emotional health. People who are mentally and emotionally healthy have:  A sense of contentment   A zest for living and the ability to laugh and have fun   The ability to deal with stress and bounce back from adversity   A sense of meaning and purpose, in both their activities and their relationships   The flexibility to learn new things and adapt to change   A balance between work and play, rest and activity, etc.   The ability to build and maintain fulfilling relationships   Self-confidence and high self-esteem  These positive characteristics of mental and emotional health allow you to participate in life to the fullest extent possible through productive, meaningful activities and strong relationships. These positive characteristics also help you cope when faced with life's challenges and stresses. The role of resilience in mental and emotional health  Being emotionally and mentally healthy doesnt mean never going through bad times or experiencing emotional problems. We all go through disappointments, loss, and change. And while these are normal parts of life, they can still cause sadness, anxiety, and stress.   The difference is that people with good emotional health have an ability to bounce back from adversity, trauma, and stress. This ability is called resilience. People who are emotionally and mentally healthy have the tools for coping with difficult situations and maintaining a positive outlook. They remain focused, flexible, and creative in bad times as well as good. One of the key factors in resilience is the ability to balance stress and your emotions. The capacity to recognize your emotions and express them appropriately helps you avoid getting stuck in depression, anxiety, or other negative mood states. Another key factor is having a strong support network. Having trusted people you can turn to for encouragement and support will boost your resilience in tough times. Supportive relationships: The foundation of emotional health  No matter how much time you devote to improving your mental and emotional health, you will still need the company of others to feel and be your best. Humans are social creatures with an emotional need for relationships and positive connections to others. Were not meant to survive, let alone thrive, in isolation. Our social brains crave companionshipeven when experience has made us shy and distrustful of others. Social interactionspecifically talking to someone else about your problemscan also help to reduce stress. The key is to find a supportive relationship with someone who is a good listenersomeone you can talk to regularly, preferably face-to-face, who will listen to you without a pre-existing agenda for how you should think or feel. A good listener will listen to the feelings behind your words, and wont interrupt or  or criticize you. The best way to find a good listener? Be a good listener yourself. Develop a friendship with someone you can talk to regularly, and then listen and support each other.   Tips and strategies for connecting to others:  Get out from behind your TV or computer screen. Screens have their place but they will never have the same effect as an expression of interest or a reassuring touch. Communication is a largely nonverbal experience that requires you to be in direct contact with other people, so dont neglect your real-world relationships in favor of virtual interaction. Spend time daily, face-to-face, with people you like. Make spending time with people you enjoy a priority. Choose friends, neighbors, colleagues, and family members who are upbeat, positive, and interested in you. Take time to inquire about people you meet during the day that you like. Volunteer. Doing something that helps others has a beneficial effect on how you feel about yourself. The meaning and purpose you find in helping others will enrich and expand your life. There is no limit to the individual and group volunteer opportunities you can explore. Schools, churches, nonprofits, and charitable organization of all sorts depend on volunteers for their survival.   Be a . Join networking, social action, conservation, and special interest groups that meet on a regular basis. These groups offer wonderful opportunities for finding people with common interestspeople you like being with who are potential friends.

## 2021-09-15 NOTE — PROGRESS NOTES
Behavioral Health Consultation/Psychotherapy Note  Alfa Park Psy.D. Visit Date:  9/15/2021    Patient:  Fly Suarez  YOB: 1985  Chief Complaint:  Follow-up, Depression, Anxiety, and Stress    Duration of session:  45 minutes      S:     Ct reported the usual ups and downs. He had his first appt with his new psychiatric provider yesterday. He has done some things at home, like stretching or something, but can't do too much. He said everyone makes him think the weight is a reason he's unhappy, and he talked about how that isn't even the biggest issue, and not everything needs to focus on that. We processed thoughts and feelings about the situation. O:    Appearance    Patient presents as alert, oriented, and cooperative Appetite abnormal: overeating  Sleep disturbance No  Loss of pleasure Some  Speech    normal rate, normal volume, well articulated and clear and understandable  Mood    Dysthymic  [place]  Affect    depressed affect  Thought Process    linear, goal directed, coherent and linear and coherent  Insight    Fair  Judgment    Intact  Memory    recent and remote memory intact  Suicide Assessment    Denied SI, plans or intent      A:    1. Dysthymia    2. Major depressive disorder, recurrent episode, severe with anxious distress ([address]        Dysthymia seems to be part of his presentation with the MDD episodes coming and going.  Overall, ct is stable.  Will continue to encourage behavioral activation and use supportive counseling. P:    Continue to meet monthly for psychotherapy. All questions about treatment plan answered. Patient instructed to go immediately to the emergency room and/or call 911 if any suicidal or homicidal ideations. Patient stated understanding and is agreeable to treatment and crisis plan.           Provider Signature:  Electronically signed by Kee Galindo PSYD on 9/15/2021 at 2:08 PM

## 2021-11-01 ENCOUNTER — OFFICE VISIT (OUTPATIENT)
Dept: PSYCHOLOGY | Age: 36
End: 2021-11-01
Payer: COMMERCIAL

## 2021-11-01 DIAGNOSIS — F34.1 DYSTHYMIA: Primary | ICD-10-CM

## 2021-11-01 DIAGNOSIS — F33.2 MAJOR DEPRESSIVE DISORDER, RECURRENT EPISODE, SEVERE WITH ANXIOUS DISTRESS (HCC): ICD-10-CM

## 2021-11-01 PROCEDURE — 1036F TOBACCO NON-USER: CPT | Performed by: PSYCHOLOGIST

## 2021-11-01 PROCEDURE — 90834 PSYTX W PT 45 MINUTES: CPT | Performed by: PSYCHOLOGIST

## 2021-11-01 NOTE — PROGRESS NOTES
Behavioral Health Consultation/Psychotherapy Note  Mike Montiel. Robert Montanez, Janice Visit Date:  11/1/2021    Patient:  Shreya Isaac  YOB: 1985  Chief Complaint:  Follow-up, Depression, and Stress    Duration of session:  40 minutes      S:     Ct said he has been trying to get on the exercise bike more, now up to about 7 min. He's also doing things like walking around the house, taking care of the dogs. Ct said his sleep has been a mess, and he has been sleeping about 4-5 hours at a time, then back to sleep, off and on throughout the day he will take naps. He plans to find a new psychiatric provider soon. We did the vital needs list today:  -Approval and Acceptance - would like others to accept who he is and not try and change him;    -mom does this in some ways but not in others  -$ -   -1:1 time - spending time with friends (online lupe); he feels he always has to initiate,   -Personal time (met)  -Sleep - has gotten a little better, he sleeps on his side best  [employer] (met)  -Time alone (met)      O:    Appearance    Patient presents as alert, oriented, and cooperative Appetite abnormal: overeating  Sleep disturbance No  Loss of pleasure Some  Speech    normal rate, normal volume, well articulated and clear and understandable  Mood    Dysthymic  [place]  Affect    depressed affect  Thought Process    linear, goal directed, coherent and linear and coherent  Insight    Fair  Judgment    Intact  Memory    recent and remote memory intact  Suicide Assessment    Denied SI, plans or intent    A:    1. Dysthymia    2. Major depressive disorder, recurrent episode, severe with anxious distress ([facility]      Dysthymia seems to be part of his presentation with the MDD episodes coming and going.  Overall, ct is stable.  Will continue to encourage behavioral activation and use supportive counseling. P:    Continue to meet monthly for psychotherapy.     All questions about treatment plan answered. Patient instructed to go immediately to the emergency room and/or call 911 if any suicidal or homicidal ideations. Patient stated understanding and is agreeable to treatment and crisis plan.           Provider Signature:  Electronically signed by Ganga Olivera PSYD on 11/1/2021 at 3:49 PM

## 2021-11-29 ENCOUNTER — TELEPHONE (OUTPATIENT)
Dept: PSYCHOLOGY | Age: 36
End: 2021-11-29

## 2021-11-29 NOTE — TELEPHONE ENCOUNTER
[facility] is requesting a medication refill on Natanael's behalf for [address] 40mg;#30 with 2 refills;last with a start date of 09/14/21 and last refill date of 11/11/21. He does not return until 12/14/21 leaving him just short of medication. He was seen by this provider for a new patient appt on 09/14/21 (transfer)    Medication is pending your approval for a 30 day supply with 0 refills due to previous encounter requesting transfer of care.

## 2021-11-29 NOTE — TELEPHONE ENCOUNTER
Pt's mom called in on pt's behalf. She states he wants to see a different psychiatrist. She said pt complained that the provider was \"acting more like a PCP, ordering blood work and sleep studies. Pt was there with his mom. He was asked to explain the problem. He states he didn't feel listened to- there was more focus on weight than mental health. He said he was coming in for mental health/meds and he wants the provider to focus on this. He would like to switch to a new provider. Please advise.

## 2021-11-30 RX ORDER — VILAZODONE HYDROCHLORIDE 40 MG/1
TABLET ORAL
Qty: 30 TABLET | Refills: 2 | Status: SHIPPED | OUTPATIENT
Start: 2021-11-30 | End: 2022-02-01 | Stop reason: SDUPTHER

## 2021-11-30 RX ORDER — LAMOTRIGINE 200 MG/1
200 TABLET ORAL DAILY
Qty: 30 TABLET | Refills: 2 | Status: SHIPPED | OUTPATIENT
Start: 2021-11-30 | End: 2022-02-01 | Stop reason: SDUPTHER

## 2021-11-30 NOTE — TELEPHONE ENCOUNTER
Refill request(s) approved and sent to pharmacy. I sent over enough medication for 3 months to cover patient until he is able to establish with a new provider. Please cancel upcoming appointment with this provider as he has requested to transfer care.

## 2021-11-30 NOTE — TELEPHONE ENCOUNTER
Holistic mental health care and the standard of care in mental health treatment includes lab work and identifying and/or ruling out other factors that can contribute negatively overall to mental health - such as JARED, obesity, thyroid disorders, anemia, vitamin deficiencies, history of trauma, situational stressors, etc. If patient wants Argelia Huan meds\" this is likely not a good fit and transfer to a different provider is appropriate if accepted by someone else.

## 2021-11-30 NOTE — TELEPHONE ENCOUNTER
I spoke with the patients mother Bipin Shelton) to get him scheduled with Dr. Michael Rajan; he is scheduled on 2/01/22. While on the phone, she mentioned that he will also need his Lamictal 200mg medication. Records indicate that the last one was sent on 09/14/21 for a 30 day supply with 2 refills. Medication is pending your approval for the 30 day supplies with 2 refills. Please advise otherwise.

## 2021-11-30 NOTE — TELEPHONE ENCOUNTER
I agree with Lala's plan of care to rule out other causes of symptoms. I am willing to accept transfer if all parties agreeable.   Electronically signed by Laine Pryor MD on 11/30/2021 at 10:26 AM

## 2022-01-03 ENCOUNTER — VIRTUAL VISIT (OUTPATIENT)
Dept: PSYCHOLOGY | Age: 37
End: 2022-01-03
Payer: COMMERCIAL

## 2022-01-03 DIAGNOSIS — F34.1 DYSTHYMIA: Primary | ICD-10-CM

## 2022-01-03 DIAGNOSIS — F33.2 MAJOR DEPRESSIVE DISORDER, RECURRENT EPISODE, SEVERE WITH ANXIOUS DISTRESS (HCC): ICD-10-CM

## 2022-01-03 PROCEDURE — 90832 PSYTX W PT 30 MINUTES: CPT | Performed by: PSYCHOLOGIST

## 2022-01-03 NOTE — PROGRESS NOTES
Behavioral Health Consultation/Psychotherapy Note  Kim Marino. Matthieu Watts Psy.D. Visit Date:  1/3/2022    Patient:  Fadia Mcgarry  YOB: 1985  Chief Complaint:  Follow-up, Stress, and Depression    Duration of session:  30 minutes      S:     This session was conducted as a telepsychology visit due to [facility] restrictions placed on in-person visits d/t the COVID-19 outbreak. Patient Location: Home       Provider Location ([facility]    Patient gave verbal consent for teleservices and will sign a consent form when feasible. This virtual visit was conducted via interactive/real-time audio/video, using Doxy. me. Ct said the holidays made him feel life was worthless. 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 He said he was crushed and his life feels useless at times. We talked about how to frame this as an opportunity to reach out and challenge himself to try and get some part-time work and he was agreeable. O:    Sleep disturbance Some  Loss of pleasure Some  Speech    normal rate, normal volume, well articulated and clear and understandable  Mood    Dysthymic  Thought Process    linear, goal directed, coherent and linear and coherent  Insight    Fair  Judgment    Intact  Memory    recent and remote memory intact  Suicide Assessment    Denied SI, plans or intent      A:    1. Dysthymia    2. Major depressive disorder, recurrent episode, severe with anxious distress ([address]        Dysthymia seems to be part of his presentation with the MDD episodes coming and going.  Overall, ct is stable.  Will continue to encourage behavioral activation and use supportive counseling. P:    Virtual visit in 1-2 mos. All questions about treatment plan answered. Patient instructed to go immediately to the emergency room and/or call 911 if any suicidal or homicidal ideations. Patient stated understanding and is agreeable to treatment and crisis plan.           Provider Signature:  Electronically signed by Terrence Pryor PSYD on 1/3/2022 at 3:07 PM

## 2022-02-01 ENCOUNTER — VIRTUAL VISIT (OUTPATIENT)
Dept: PSYCHIATRY | Age: 37
End: 2022-02-01
Payer: COMMERCIAL

## 2022-02-01 DIAGNOSIS — G47.00 INSOMNIA, UNSPECIFIED TYPE: ICD-10-CM

## 2022-02-01 DIAGNOSIS — F39 MOOD DISORDER (HCC): Primary | ICD-10-CM

## 2022-02-01 DIAGNOSIS — F41.1 GENERALIZED ANXIETY DISORDER: ICD-10-CM

## 2022-02-01 PROCEDURE — 99215 OFFICE O/P EST HI 40 MIN: CPT | Performed by: PSYCHIATRY & NEUROLOGY

## 2022-02-01 PROCEDURE — G8427 DOCREV CUR MEDS BY ELIG CLIN: HCPCS | Performed by: PSYCHIATRY & NEUROLOGY

## 2022-02-01 RX ORDER — VILAZODONE HYDROCHLORIDE 40 MG/1
TABLET ORAL
Qty: 30 TABLET | Refills: 3 | Status: SHIPPED | OUTPATIENT
Start: 2022-02-01 | End: 2022-05-03 | Stop reason: SDUPTHER

## 2022-02-01 RX ORDER — LAMOTRIGINE 200 MG/1
200 TABLET ORAL DAILY
Qty: 30 TABLET | Refills: 3 | Status: SHIPPED | OUTPATIENT
Start: 2022-02-01 | End: 2022-05-03 | Stop reason: SDUPTHER

## 2022-02-01 NOTE — PROGRESS NOTES
[address] [facility]  [facility] [place], [place] [address]  [phone]    Progress Note    Patient:  Sang Traylor  YOB: 1985  PCP:  CHARITO Child CNP  Visit Date:  2/1/2022    TELEHEALTH EVALUATION -- Audio/Visual (During NMGZJ-76 public health emergency)    Patient location: home  Physician location: [place], [facility]  This virtual visit was conducted via interactive, real-time video. Chief Complaint   Patient presents with    [facility] Care    Anxiety    Depression    Insomnia       SUBJECTIVE:    Time in: 2:09pm  Time out: 2:45pm  Documentation time: 10 minutes    Sang Traylor, a 29 y.o. male, presents for a follow up visit. Patient reports he is stable. Patient is compliant with medication regimen. He presents alone. First time seeing me as [facility]Dr. Latasha Ventura. Notes current medications have kept mood feeling \"stable\". Still intermittently feels depressed, consistently, chronic. Ongoing anxiety. \"anything\" triggers it. \"Having to do real life things. \" phone calls, going out in public, interacting with people. Stutters and loses train of thought. Avoids leaving the house. Notes not many interests outside the home so not motivated to leave. Associates leaving \"with things are going to go bad. I associate life with things going wrong. \"   Began around \"when everything started going wrong\". Issues with sleep. Notes in the past was [place] lot worse\". Notes most benefit from Lamictal. Before that \"serious\" mood instability. \"breakdowns screaming and crying when things got too stressful\". Notes it no longer happens. No longer feels irritable or snaps at others. Energy is low. Not much motivation unless he's very interested. Tried to get on disability \"I can't function\". Too anxious and \"unstable\" to go to work.  \"too worried I'm going to lose my shit, lash out at someone\". Worries he will go to [facility]. Lives in [address] with his mom, his biggest support. Has a twin sister (lives in mobile home and travels all over) and an older brother (Shaun). Doesn't keep in touch with them much. Parents  at age 26. Denies any alcohol or drug use. Denies any h/o AVH. Maybe sees something in the periphery from sleep deprivation. Last took Seroquel around a year ago. Notes it worsened his sleep. Feels it's effects are still there. Notes h/o ADHD which was dx as a kid \"I can't stop thinking\" notably about things that make him feel bad. Took Ritalin and Adderall as a kid. Has never worked. Notes he \"barely\" got through his associate's degree. Had a lot of breakdowns around that time. Notes it took him 5 yrs to finish a 2 year degree. We discussed whether the outcome would be different if ADHD had been treated. Has had high energy spells. Wonders about bipolar 2. Gets more motivation. Irritable. Might last 1-2 days. Rare. Denies any recent suicidal thoughts. Had them in the past. Thoughts or statements of \"I wanna die, not because I actually do\" just expressing himself. Per chart review no h/o SA or IP tx. Notes doing better with sleep this week. Notes h/o JARED, helps to lay on his side. Doesn't use CPAP. Has done a better job staying awake through the day to promote better sleep at night. Took Seroquel about a year ago x2 mos. Felt it worsened his sleep. Feels narcoleptic in daytime \"forced to fall asleep\" unnecessarily drowsy in daytime. Sitting up and just \"black out\". Happens about twice daily. Felt that was his routine for awhile. Has been fighting it and it's improving with better sleep routine and hygiene. \"I usually screw up. \" h/o low self esteem. Declines to make any changes today. Med Trials: Seroquel (drowsiness), Lamictal, Viibryd, Prozac, Ritalin, Adderall    OBJECTIVE:  Vitals: There were no vitals taken for this visit.     MENTAL STATUS EXAM:    GENERAL  Build: Overweight    Hygiene:  Appropriate    SENSORIUM Orientation: Place, Person, Time, & Situation     Consciousness: Alert    ATTENTION   Focused    RELATEDNESS  Cooperative    EYE CONTACT   Good to Fair   PSYCHOMOTOR  Agitation, rocking side to side at one point    SPEECH Volume: Normal     Rate: Normal rate and tone    Amplitude: Within normal limits   MOOD  \"stable\"    AFFECT Range: Full    THOUGHT Process:  Goal-Directed     Content: no evidence of psychosis    COGNITION Insight: Good    Judgement:  Intact    MEMORY  Intact    INTELLIGENCE  Average     Sleep: improving   Nutrition: Overweight   ADL: Satisfactory   Mobility/Gait: Independently     Controlled SubstancesMonitoring:   not done today      ASSESSMENT: No changes today per his request. Long h/o depression, anxiety. H/o ADHD and I suspect he may have had a better time through college if it had been treated. In therapy. No SI, no psychosis, no substances. Diagnosis Orders   1. Mood disorder ([facility]     2. Generalized anxiety disorder     3. Insomnia, unspecified type     R/o: dysthymia, MDD, BD    PLAN:     · Medications:   · Viibryd 40 mg QD  · Lamictal 200 mg Qd  · Therapy: sees Dr. Jose Juan Stout   · Labs/Tests/Imaging: none   · Records Reviewed: [facility]  · Patient advised to call if patient has any difficulties with treatment  Return in about 3 months (around 5/1/2022) for med check, follow up. Electronically signed by Sal Plunkett MD on 2/1/2022 at 2:53 PM    Margarette Gauthier is a 29 y.o. male being evaluated by a Virtual Visit (video visit) to address concerns as mentioned above. A caregiver was present when appropriate. Due to this being a TeleHealth encounter ([facility] public [facility]), evaluation of the following organ systems was limited: Vitals/Constitutional/EENT/Resp/CV/GI//MS/Neuro/Skin/Heme-Lymph-Imm.   Pursuant to the emergency declaration under the [facility] [address] waiver authority and the [employer] Act, this Virtual Visit was conducted with patient's (and/or legal guardian's) consent, to reduce the patient's risk of exposure to COVID-19 and provide necessary medical care. The patient (and/or legal guardian) has also been advised to contact this office for worsening conditions or problems, and seek emergency medical treatment and/or call 911 if deemed necessary. Patient identification was verified at the start of the visit: Yes     Total time spent for this encounter: 41 minutes     Services were provided through a video synchronous discussion virtually to substitute for in-person clinic visit. Patient and provider were located at their individual homes.     Electronically signed by Suzie Rangel MD on 2/1/2022 at 2:53 PM

## 2022-03-08 ENCOUNTER — TELEMEDICINE (OUTPATIENT)
Dept: PSYCHOLOGY | Age: 37
End: 2022-03-08
Payer: COMMERCIAL

## 2022-03-08 DIAGNOSIS — F34.1 DYSTHYMIA: Primary | ICD-10-CM

## 2022-03-08 DIAGNOSIS — F33.1 MAJOR DEPRESSIVE DISORDER, RECURRENT EPISODE, MODERATE WITH ANXIOUS DISTRESS (HCC): ICD-10-CM

## 2022-03-08 PROCEDURE — 90832 PSYTX W PT 30 MINUTES: CPT | Performed by: PSYCHOLOGIST

## 2022-03-08 ASSESSMENT — PATIENT HEALTH QUESTIONNAIRE - PHQ9
3. TROUBLE FALLING OR STAYING ASLEEP: 1
4. FEELING TIRED OR HAVING LITTLE ENERGY: 3
SUM OF ALL RESPONSES TO PHQ QUESTIONS 1-9: 7
SUM OF ALL RESPONSES TO PHQ9 QUESTIONS 1 & 2: 1
1. LITTLE INTEREST OR PLEASURE IN DOING THINGS: 0
SUM OF ALL RESPONSES TO PHQ QUESTIONS 1-9: 7
2. FEELING DOWN, DEPRESSED OR HOPELESS: 1
10. IF YOU CHECKED OFF ANY PROBLEMS, HOW DIFFICULT HAVE THESE PROBLEMS MADE IT FOR YOU TO DO YOUR WORK, TAKE CARE OF THINGS AT HOME, OR GET ALONG WITH OTHER PEOPLE: 2
SUM OF ALL RESPONSES TO PHQ QUESTIONS 1-9: 7
8. MOVING OR SPEAKING SO SLOWLY THAT OTHER PEOPLE COULD HAVE NOTICED. OR THE OPPOSITE, BEING SO FIGETY OR RESTLESS THAT YOU HAVE BEEN MOVING AROUND A LOT MORE THAN USUAL: 0
SUM OF ALL RESPONSES TO PHQ QUESTIONS 1-9: 7
6. FEELING BAD ABOUT YOURSELF - OR THAT YOU ARE A FAILURE OR HAVE LET YOURSELF OR YOUR FAMILY DOWN: 1
9. THOUGHTS THAT YOU WOULD BE BETTER OFF DEAD, OR OF HURTING YOURSELF: 0
7. TROUBLE CONCENTRATING ON THINGS, SUCH AS READING THE NEWSPAPER OR WATCHING TELEVISION: 1
5. POOR APPETITE OR OVEREATING: 0

## 2022-03-08 NOTE — PROGRESS NOTES
Behavioral Health Consultation/Psychotherapy Note  Erika Pandya. Robert Montiel Psy.D. Visit Date:  3/8/2022    Patient:  Moy Sparks  YOB: 1985  Chief Complaint:  Follow-up, Depression, and Stress    Duration of session:  20 minutes      S:     Moy Sparks, was evaluated through a synchronous (real-time) audio-video  encounter. The patient (or guardian if applicable) is aware that this is a billable  service, which includes applicable co-pays. This Virtual Visit was conducted with  patient's (and/or legal guardian's) consent. The visit was conducted pursuant to  the emergency declaration under the [address] [place] [address] authority and the [employer] and  [employer] General Act. Patient identification was verified,  and a caregiver was present when appropriate. Patient location: Home  Provider location:  [facility], [address] said he's been doing well. Not much has changed with his situation. 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 He feels perfectly socially fulfilled talking to people online, and feels best when he stays home and doesn't go out with others. O:    Appearance    Patient presents as alert, oriented, and cooperative Appetite abnormal: overeating  Sleep disturbance No  Loss of pleasure Some  Speech    normal rate, normal volume, well articulated and clear and understandable  Mood    Dysthymic  [place]  Affect    depressed affect  Thought Process    linear, goal directed, coherent and linear and coherent  Insight    Fair  Judgment    Intact  Memory    recent and remote memory intact  Suicide Assessment    Denied SI, plans or intent      A:    1. Dysthymia    2.  Major depressive disorder, recurrent episode, moderate with anxious distress ([facility]        Dysthymia seems to be part of his presentation with the MDD episodes coming and going.  Overall, ct is stable.  Will continue to encourage behavioral activation and use supportive counseling. PHQ Scores 3/8/2022 7/1/2019 11/30/2018 6/8/2018 7/28/2017   PHQ2 Score 1 2 2 5 0   PHQ9 Score 7 12 8 18 0     Interpretation of Total Score Depression Severity: 1-4 = Minimal depression, 5-9 = Mild depression, 10-14 = Moderate depression, 15-19 = Moderately severe depression, 20-27 = Severe depression        P:    Virtual visit in 3 mos. All questions about treatment plan answered. Patient instructed to go immediately to the emergency room and/or call 911 if any suicidal or homicidal ideations. Patient stated understanding and is agreeable to treatment and crisis plan.           Provider Signature:  Electronically signed by Dara Maajno PSYD on 3/8/2022 at 4:01 PM

## 2022-05-03 ENCOUNTER — TELEMEDICINE (OUTPATIENT)
Dept: PSYCHIATRY | Age: 37
End: 2022-05-03
Payer: COMMERCIAL

## 2022-05-03 DIAGNOSIS — G47.00 INSOMNIA, UNSPECIFIED TYPE: ICD-10-CM

## 2022-05-03 DIAGNOSIS — F41.1 GENERALIZED ANXIETY DISORDER: ICD-10-CM

## 2022-05-03 DIAGNOSIS — F39 MOOD DISORDER (HCC): Primary | ICD-10-CM

## 2022-05-03 PROCEDURE — G8427 DOCREV CUR MEDS BY ELIG CLIN: HCPCS | Performed by: PSYCHIATRY & NEUROLOGY

## 2022-05-03 PROCEDURE — 99213 OFFICE O/P EST LOW 20 MIN: CPT | Performed by: PSYCHIATRY & NEUROLOGY

## 2022-05-03 RX ORDER — LAMOTRIGINE 200 MG/1
200 TABLET ORAL DAILY
Qty: 30 TABLET | Refills: 3 | Status: SHIPPED | OUTPATIENT
Start: 2022-05-03 | End: 2022-08-02 | Stop reason: SDUPTHER

## 2022-05-03 RX ORDER — VILAZODONE HYDROCHLORIDE 40 MG/1
TABLET ORAL
Qty: 30 TABLET | Refills: 3 | Status: SHIPPED | OUTPATIENT
Start: 2022-05-03 | End: 2022-08-02 | Stop reason: SDUPTHER

## 2022-05-03 NOTE — PROGRESS NOTES
[facility]  [facility] [address] [phone]    Progress Note    Patient:  Garry Marr  YOB: 1985  PCP:  CHARITO Kebede CNP  Visit Date:  5/3/2022    TELEHEALTH EVALUATION -- Audio/Visual (During SSQZO-60 public health emergency)    Patient location: home  Physician location: home, [address]  This virtual visit was conducted via interactive, real-time video. Chief Complaint   Patient presents with    Follow-up    Medication Check    Depression    Anxiety    Insomnia       SUBJECTIVE:      Garry Marr, a 29 y.o. male, presents for a follow up visit. Patient reports he is stable. Patient is compliant with medication regimen. He presents alone. Doing \"okay\". Building himself a new computer. Plans to start today or tomorrow. Notes costly, delicate parts. Mood is \"okay\". More stress the last week or so waiting for the computer parts. Sleep \"a lot better\" using melatonin. Hasn't taken it for a few days. Anxiety is mostly under control and over \"normal\" things. Thinks he doesn't handle it as well as he should. [place], scream, agitated. Hard on himself. Doesn't happen very often. Per chart review happened more prior to taking Lamictal.  Focus is \"bad as usual\" noting his typical ADHD. Wonders about something for ADHD. Hasn't tried anything as an adult. Was on Ritalin and Adderall as a kid. Denies any h/o cardiac issues. He initially wants to make a change then thinks of a friend who went to ED with heart issue after trying stimulant. He would rather hold off on trying that which makes him feel anxious about having the same problem. Might consider Wellbutrin to help both mood and ADHD. Declines med changes for today. Med Trials: Seroquel (drowsiness, but worsened sleep), Lamictal, Viibryd, Prozac, Ritalin, Adderall    OBJECTIVE:  Vitals:  There were no vitals taken for this visit. MENTAL STATUS EXAM:    GENERAL  Build: Overweight    Hygiene:  Appropriate in casual dress   SENSORIUM Orientation: Place, Person, Time, & Situation     Consciousness: Alert    ATTENTION   Focused    RELATEDNESS  Cooperative    EYE CONTACT   Good   PSYCHOMOTOR  Normal   SPEECH Volume: Normal     Rate: Normal rate and tone    Amplitude: Within normal limits   MOOD  \"okay\"    AFFECT Range: Limited, mood congruent   THOUGHT Process:  Goal-Directed     Content: no evidence of psychosis    COGNITION Insight: Good    Judgement:  Intact    MEMORY  Intact    INTELLIGENCE  Average     Sleep: Is sleeping well   Nutrition: Overweight   ADL: Satisfactory   Mobility/Gait: Independently     Controlled SubstancesMonitoring:   not done today      ASSESSMENT: Mood is stable. Focus is an issue but prefers to wait to make any changes. Will consider use of Wellbutrin for mood and ADHD. He is anxious about stimulant d/t potential for heart issues although he has no personal cardiac hx. Lamictal with benefit for mood instability. Question of possible bipolar. Rare 1-2 day episodes of high energy, irritability, more motivation. Diagnosis Orders   1. Mood disorder ([facility] [address]     2. Generalized anxiety disorder     3. Insomnia, unspecified type     R/o: dysthymia, MDD, BD    PLAN:     · Medications:   · Viibryd 40 mg QD  · Lamictal 200 mg QD  · Therapy: sees Dr. Williams Nobles   · Labs/Tests/Imaging: none   · Records Reviewed: [facility]  · Patient advised to call if patient has any difficulties with treatment  Return in about 3 months (around 8/3/2022) for med check, follow up. Rosalio Demi, was evaluated through a synchronous (real-time) audio-video encounter. The patient (or guardian if applicable) is aware that this is a billable service, which includes applicable copays. This virtual visit was conducted with patient's (and/or legal guardian's) consent.  The visit was conducted pursuant to the emergency declaration under the [facility], [address] [place] [address] [facility] authority and the [employer] and "[employer] General Act. Patient identification was verified, and a caregiver was present when appropriate. The patient was located in a state where the provider was licensed to provide care.       Total time spent for this encounter: not billed by time    Electronically signed by Kati Haywood MD on 5/3/2022 at 4:13 PM

## 2022-06-07 ENCOUNTER — TELEMEDICINE (OUTPATIENT)
Dept: PSYCHOLOGY | Age: 37
End: 2022-06-07
Payer: COMMERCIAL

## 2022-06-07 DIAGNOSIS — F34.1 DYSTHYMIA: Primary | ICD-10-CM

## 2022-06-07 DIAGNOSIS — F33.1 MAJOR DEPRESSIVE DISORDER, RECURRENT EPISODE, MODERATE WITH ANXIOUS DISTRESS (HCC): ICD-10-CM

## 2022-06-07 PROCEDURE — 90832 PSYTX W PT 30 MINUTES: CPT | Performed by: PSYCHOLOGIST

## 2022-06-07 NOTE — PROGRESS NOTES
Behavioral Health Consultation/Psychotherapy Note  Rajesh Forrest. Sarah Fernandez Psy.D. Visit Date:  6/7/2022    Patient:  Stephanie Sifuentes  YOB: 1985  Chief Complaint:  Follow-up, Depression, and Stress    Duration of session:  16 minutes      S:     Ct said his mood has been better and he's taking care of himself better. He denied any anxiety that he couldn't handle. He started working on his computer but took it to a shop so they could finish it for him. He hasn't started his lupe service yet, but he plans to as soon as he gets all the hardware and electronics he needs. He noted he's sleeping better, which has helped improve his mood. O:    Appearance    Patient presents as alert, oriented, and cooperative   Appetite abnormal: overeating  Sleep disturbance No  Loss of pleasure Some  Speech    normal rate, normal volume, well articulated and clear and understandable  Mood    Euthymic  Affect    pleasant affect  Thought Process    goal directed, coherent and linear   Insight    Fair  Judgment    Intact  Memory    recent and remote memory intact  Suicide Assessment    Denied SI, plans or intent      A:    1. Dysthymia    2. Major depressive disorder, recurrent episode, moderate with anxious distress (HCC)        Dysthymia seems to be part of his presentation with the MDD episodes coming and going.  Overall, ct is stable.  Will continue to encourage behavioral activation and use supportive counseling. P:    Virtual visit in 3 mos. Stephanie Sifuentes, was evaluated through a synchronous (real-time) audio-video encounter. The patient (or guardian if applicable) is aware that this is a billable service, which includes applicable co-pays. This Virtual Visit was conducted with patient's (and/or legal guardian's) consent.  The visit was conducted pursuant to the emergency declaration under the [facility], [address] waiver authority and the [facility] and

## 2022-06-14 RX ORDER — LAMOTRIGINE 200 MG/1
TABLET ORAL
Qty: 30 TABLET | Refills: 0 | OUTPATIENT
Start: 2022-06-14

## 2022-08-02 ENCOUNTER — TELEMEDICINE (OUTPATIENT)
Dept: PSYCHIATRY | Age: 37
End: 2022-08-02
Payer: COMMERCIAL

## 2022-08-02 DIAGNOSIS — G47.00 INSOMNIA, UNSPECIFIED TYPE: ICD-10-CM

## 2022-08-02 DIAGNOSIS — F41.1 GENERALIZED ANXIETY DISORDER: ICD-10-CM

## 2022-08-02 DIAGNOSIS — F39 MOOD DISORDER (HCC): Primary | ICD-10-CM

## 2022-08-02 PROCEDURE — G8427 DOCREV CUR MEDS BY ELIG CLIN: HCPCS | Performed by: PSYCHIATRY & NEUROLOGY

## 2022-08-02 PROCEDURE — 99213 OFFICE O/P EST LOW 20 MIN: CPT | Performed by: PSYCHIATRY & NEUROLOGY

## 2022-08-02 RX ORDER — OMEPRAZOLE 20 MG/1
20 CAPSULE, DELAYED RELEASE ORAL DAILY
Qty: 30 CAPSULE | Refills: 3 | Status: SHIPPED | OUTPATIENT
Start: 2022-08-02

## 2022-08-02 RX ORDER — LAMOTRIGINE 200 MG/1
200 TABLET ORAL DAILY
Qty: 30 TABLET | Refills: 3 | Status: SHIPPED | OUTPATIENT
Start: 2022-08-02 | End: 2022-11-02 | Stop reason: SDUPTHER

## 2022-08-02 RX ORDER — VILAZODONE HYDROCHLORIDE 40 MG/1
TABLET ORAL
Qty: 30 TABLET | Refills: 3 | Status: SHIPPED | OUTPATIENT
Start: 2022-08-02 | End: 2022-11-02 | Stop reason: SDUPTHER

## 2022-08-02 NOTE — PROGRESS NOTES
[address] [facility] PSYCHIATRY  [facility] [id number] [phone]    Progress Note    Patient:  Sinan Ennis  YOB: 1985  PCP:  CHARITO Ahn CNP  Visit Date:  8/2/2022    TELEHEALTH EVALUATION -- Audio/Visual (During XHA-01 public health emergency)    Patient location: home  Physician location: [place], [facility]  This virtual visit was conducted via interactive, real-time video. Chief Complaint   Patient presents with    Follow-up    Medication Check    Anxiety    Insomnia    Depression         SUBJECTIVE:      Sinan Ennis, a 40 y.o. male, presents for a follow up visit. Patient reports he is  stable . Patient is compliant with medication regimen. He presents alone. Doing \"okay\". Unclear if feeling any better/worse from last visit. Appears about same. Focus is ok lately. No complaints. At times feels dip in mood \"passes quickly\". Considers it his norm. Maybe every couple days. \"Something bad happens and sticks with me for a little while. \"  Dwells on things too much. If he doesn't \"constantly distract myself\" gets intrusive thoughts that lead to depression and anger. Doesn't find it hard to stay busy with things. Used to managing the intrusive thoughts. Will use music to cope as well. Declines to make any med changes. Sleep fair, using melatonin which helps him falls asleep otherwise has thoughts \"nonstop\". Takes a brek from it occasionally. Got stuck on building computer. Panic and gave up. Gave it to someone else to put together. Has been using it and it's working well. Tends to jump to conclusions. Dislikes PCP, focus on his weight. Asks me to Rx his Prilosec which I'll do until he gets a different PCP. We agree to make no med changes today.        Med Trials: Seroquel (drowsiness, but worsened sleep), Lamictal, Viibryd, Prozac, Ritalin, Adderall    OBJECTIVE:  Vitals: There were no vitals taken for this visit. MENTAL STATUS EXAM:    GENERAL  Build: Overweight    Hygiene:  Appropriate in casual dress   SENSORIUM Orientation: Place, Person, Time, & Situation     Consciousness: Alert    ATTENTION   Focused    RELATEDNESS  Cooperative    EYE CONTACT   Good   PSYCHOMOTOR  Normal   SPEECH Volume: Normal     Rate: Normal rate and tone    Amplitude: Within normal limits   MOOD  Euthymic     AFFECT Range: Limited,  mood congruent, social smile prsent   THOUGHT Process:  Goal-Directed     Content: no evidence of psychosis    COGNITION Insight: Good    Judgement:  Intact    MEMORY  Intact    INTELLIGENCE  Average     Sleep: Is sleeping well   Nutrition: Overweight   ADL: Satisfactory   Mobility/Gait: Independently     Controlled SubstancesMonitoring:   not done today      ASSESSMENT: No med changes per his request.     Consider Wellbutrin for mood and ADHD. He is anxious about stimulant d/t potential for heart issues although he has no personal cardiac hx. Lamictal with benefit for mood instability. Question of possible bipolar. Rare 1-2 day episodes of high energy, irritability, more motivation. Diagnosis Orders   1. Mood disorder ([address]        2. Generalized anxiety disorder        3. Insomnia, unspecified type        R/o: dysthymia, MDD, BD      PLAN:     Medications:   Viibryd 40 mg QD  Lamictal 200 mg QD  Melatonin 5 mg QHs  Therapy: sees Dr. Victor Manuel Noel   Labs/Tests/Imaging: none   Records Reviewed: [facility]  Patient advised to call if patient has any difficulties with treatment  Return in about 3 months (around 11/2/2022) for med check, follow up. Edwige Pap, was evaluated through a synchronous (real-time) audio-video encounter. The patient (or guardian if applicable) is aware that this is a billable service, which includes applicable copays. This virtual visit was conducted with patient's (and/or legal guardian's) consent.  The visit was conducted pursuant to the emergency declaration under the [address] [facility], [address] [place] [address] authority and the [facility] [facility] General Act. Patient identification was verified, and a caregiver was present when appropriate. The patient was located in a state where the provider was licensed to provide care.       Total time spent for this encounter: not billed by time    Electronically signed by Rufus Grey MD on 8/3/2022 at 8:56 AM

## 2022-09-27 ENCOUNTER — TELEMEDICINE (OUTPATIENT)
Dept: PSYCHOLOGY | Age: 37
End: 2022-09-27
Payer: COMMERCIAL

## 2022-09-27 DIAGNOSIS — F34.1 DYSTHYMIA: Primary | ICD-10-CM

## 2022-09-27 DIAGNOSIS — F33.1 MAJOR DEPRESSIVE DISORDER, RECURRENT EPISODE, MODERATE WITH ANXIOUS DISTRESS (HCC): ICD-10-CM

## 2022-09-27 PROCEDURE — 90832 PSYTX W PT 30 MINUTES: CPT | Performed by: PSYCHOLOGIST

## 2022-09-27 NOTE — PROGRESS NOTES
Psychotherapy Note  Mike Montiel. Robert Montanez, Psy.D. Visit Date:  9/27/2022    Patient:  Shreya Isaac  YOB: 1985  Chief Complaint:  Follow-up, Depression, and Stress      Duration of session:  16 minutes      S:     Ct said his mood hasn't been great, feeling pessimistic about the future. He has been taking care of his basic needs. He said nothing has changed in the past few mos. He is sleeping OK. He hasn't started his lupe service yet. Ct said he has plans to be a functional human being, but feels like he doesn't have what he needs to achieve what he wants. When asked what was missing, he gave a vague answer that didn't really provide much of an answer except that he's short on money to buy things for his computer. O:    Appearance    Patient presents as alert, oriented, and cooperative   Appetite abnormal: overeating  Sleep disturbance No  Loss of pleasure Some  Speech    normal rate, normal volume, well articulated and clear and understandable  Mood    Euthymic  Affect    pleasant affect  Thought Process    goal directed, coherent and linear   Insight    Fair  Judgment    Intact  Memory    recent and remote memory intact  Suicide Assessment    Denied SI, plans or intent      A:    1. Dysthymia    2. Major depressive disorder, recurrent episode, moderate with anxious distress (HCC)        Dysthymia seems to be part of his presentation with the MDD episodes coming and going. Overall, ct is stable. Will continue to encourage behavioral activation and use supportive counseling. P:    Check back in 3 mos. Shreya Isaac, was evaluated through a synchronous (real-time) audio-video encounter. The patient (or guardian if applicable) is aware that this is a billable service, which includes applicable co-pays. This Virtual Visit was conducted with patient's (and/or legal guardian's) consent.  The visit was conducted pursuant to the emergency declaration under the [address] and the [employer] Act, [facility] authority and the [employer] General Act. Patient identification was verified, and a caregiver was present when appropriate. The patient was located at Home: [id number] [place] [address],[address]  [address] [address]. Provider was located at Home ([place] [address]): [place]. All questions about treatment plan answered. Patient instructed to go immediately to the emergency room and/or call 911 if any suicidal or homicidal ideations. Patient stated understanding and is agreeable to treatment and crisis plan.         Provider Signature:  Electronically signed by Kathy Flaherty PSYD on 9/27/2022 at 4:18 PM

## 2022-11-02 ENCOUNTER — TELEMEDICINE (OUTPATIENT)
Dept: PSYCHIATRY | Age: 37
End: 2022-11-02
Payer: COMMERCIAL

## 2022-11-02 DIAGNOSIS — F39 MOOD DISORDER (HCC): Primary | ICD-10-CM

## 2022-11-02 DIAGNOSIS — F41.1 GENERALIZED ANXIETY DISORDER: ICD-10-CM

## 2022-11-02 DIAGNOSIS — F90.8 ADHD, ADULT RESIDUAL TYPE: ICD-10-CM

## 2022-11-02 DIAGNOSIS — G47.00 INSOMNIA, UNSPECIFIED TYPE: ICD-10-CM

## 2022-11-02 PROCEDURE — G8427 DOCREV CUR MEDS BY ELIG CLIN: HCPCS | Performed by: PSYCHIATRY & NEUROLOGY

## 2022-11-02 PROCEDURE — 99214 OFFICE O/P EST MOD 30 MIN: CPT | Performed by: PSYCHIATRY & NEUROLOGY

## 2022-11-02 RX ORDER — VILAZODONE HYDROCHLORIDE 40 MG/1
TABLET ORAL
Qty: 30 TABLET | Refills: 2 | Status: SHIPPED | OUTPATIENT
Start: 2022-11-02

## 2022-11-02 RX ORDER — BUPROPION HYDROCHLORIDE 150 MG/1
150 TABLET ORAL EVERY MORNING
Qty: 30 TABLET | Refills: 2 | Status: SHIPPED | OUTPATIENT
Start: 2022-11-02

## 2022-11-02 RX ORDER — LAMOTRIGINE 200 MG/1
200 TABLET ORAL DAILY
Qty: 30 TABLET | Refills: 2 | Status: SHIPPED | OUTPATIENT
Start: 2022-11-02

## 2022-11-02 NOTE — PROGRESS NOTES
[address] [facility] PSYCHIATRY  [facility] [id number] [phone]    Progress Note    Patient:  Jerrod Turner  YOB: 1985  PCP:  CHARITO Mar CNP  Visit Date:  11/2/2022    TELEHEALTH EVALUATION -- Audio/Visual (During OZCIU-57 public health emergency)    Patient location: home  Physician location[place], [place], [place]  This virtual visit was conducted via interactive, real-time video. Chief Complaint   Patient presents with    Follow-up    Medication Check    Depression    Anxiety    ADHD    Insomnia       SUBJECTIVE:      Jerrod Turner, a 40 y.o. male, presents for a follow up visit. Patient reports he is  fair . Patient is compliant with medication regimen. He presents alone. Doing about the same since last visit. Mood is ok, mostly stable. Not too depressed. Anxiety under fair control. Notes main concern is ADHD which was first dx as a child. Focus is his main concern. Trouble starting projects. Trouble finishing. Has tried stimulants in the past. Wants a non-stimulant. Thinks he may have tried Wellbutrin which I don't see in his chart. Never tried Strattera. Appetite and sleep are ok. Discussed tx options and we agree to a trial of Wellbutrin XL. No h/o seizures. Med Trials: Seroquel (drowsiness, but worsened sleep), Lamictal, Viibryd, Prozac, Ritalin, Adderall    OBJECTIVE:  Vitals: There were no vitals taken for this visit. MENTAL STATUS EXAM:    GENERAL  Build: Overweight    Hygiene:  Appropriate    SENSORIUM Orientation: Place, Person, Time, & Situation     Consciousness: Alert    ATTENTION   Focused    RELATEDNESS  Cooperative    EYE CONTACT   Good   PSYCHOMOTOR  Normal   SPEECH Volume: Normal     Rate: Normal rate and tone    Amplitude:  Within normal limits   MOOD  Euthymic     AFFECT Range: Full mood congruent   THOUGHT Process:  Goal-Directed     Content: no evidence of psychosis    COGNITION Insight: Good    Judgement:  Intact    MEMORY  Intact    INTELLIGENCE  Average     Sleep: Is sleeping well   Nutrition: Overweight   ADL: Satisfactory   Mobility/Gait: Independently     Controlled SubstancesMonitoring:   not done today      ASSESSMENT: Will start Wellbutrin XL for focus, mood, motivation. Lamictal with benefit for mood instability. Question of possible bipolar. Rare 1-2 day episodes of high energy, irritability, more motivation. Diagnosis Orders   1. Mood disorder ([facility] [address]        2. Generalized anxiety disorder        3. Insomnia, unspecified type        4. ADHD, adult residual type        R/o: dysthymia, MDD, BD      PLAN:     Medications:   Viibryd 40 mg QD  Lamictal 200 mg QD  Start Wellbutrin  mg QAM - discussed r/b/se/a  Melatonin 5 mg QHs  Therapy: sees Dr. Kellie Dominguez   Labs/Tests/Imaging: none   Records Reviewed: [facility]  Patient advised to call if patient has any difficulties with treatment  Return in about 6 weeks (around 12/14/2022) for med check, follow up. Teto Leos, was evaluated through a synchronous (real-time) audio-video encounter. The patient (or guardian if applicable) is aware that this is a billable service, which includes applicable copays. This virtual visit was conducted with patient's (and/or legal guardian's) consent. The visit was conducted pursuant to the emergency declaration under the [facility], [address] [place] [address] waiver authority and the [employer] General Act. Patient identification was verified, and a caregiver was present when appropriate. The patient was located in a state where the provider was licensed to provide care.       Total time spent for this encounter: not billed by time    Electronically signed by Concetta Sandoval MD on 11/2/2022 at 2:52 PM

## 2022-12-05 RX ORDER — OMEPRAZOLE 20 MG/1
20 CAPSULE, DELAYED RELEASE ORAL DAILY
Qty: 30 CAPSULE | Refills: 0 | OUTPATIENT
Start: 2022-12-05

## 2022-12-12 ENCOUNTER — HOSPITAL ENCOUNTER (EMERGENCY)
Age: 37
Discharge: HOME | End: 2022-12-12
Payer: COMMERCIAL

## 2022-12-12 VITALS
OXYGEN SATURATION: 95 % | WEIGHT: 315 LBS | DIASTOLIC BLOOD PRESSURE: 101 MMHG | HEIGHT: 72 IN | TEMPERATURE: 97.2 F | HEART RATE: 88 BPM | BODY MASS INDEX: 42.66 KG/M2 | SYSTOLIC BLOOD PRESSURE: 172 MMHG | RESPIRATION RATE: 20 BRPM

## 2022-12-12 DIAGNOSIS — H66.91 RIGHT OTITIS MEDIA, UNSPECIFIED OTITIS MEDIA TYPE: Primary | ICD-10-CM

## 2022-12-12 PROCEDURE — 99213 OFFICE O/P EST LOW 20 MIN: CPT

## 2022-12-12 PROCEDURE — 99213 OFFICE O/P EST LOW 20 MIN: CPT | Performed by: NURSE PRACTITIONER

## 2022-12-12 RX ORDER — AMOXICILLIN 875 MG/1
875 TABLET, COATED ORAL 2 TIMES DAILY
Qty: 20 TABLET | Refills: 0 | Status: SHIPPED | OUTPATIENT
Start: 2022-12-12 | End: 2022-12-22

## 2022-12-12 ASSESSMENT — PAIN - FUNCTIONAL ASSESSMENT: PAIN_FUNCTIONAL_ASSESSMENT: NONE - DENIES PAIN

## 2022-12-12 ASSESSMENT — ENCOUNTER SYMPTOMS
COUGH: 0
VOMITING: 0
SORE THROAT: 0
NAUSEA: 0
SHORTNESS OF BREATH: 0

## 2022-12-12 NOTE — DISCHARGE INSTRUCTIONS
Began using over-the-counter Tylenol and ibuprofen for pain as well as antihistamine such as Zyrtec or Claritin.

## 2022-12-12 NOTE — ED PROVIDER NOTES
[facility]       CHIEF COMPLAINT       Chief Complaint   Patient presents with    Otalgia     right       Nurses Notes reviewed and I agree except as noted in the HPI. HISTORY OF PRESENT ILLNESS   Valentino Solders is a 40 y.o. male who presents for evaluation of right ear pain that is been ongoing for the past day. Patient states that he has had mild congestion but denies any other upper respiratory symptoms. He denies any fever, chills, nausea, or vomiting. He denies taking any medications at home for the symptoms. The history is provided by the patient. REVIEW OF SYSTEMS     Review of Systems   Constitutional:  Negative for chills and fever. HENT:  Positive for congestion and ear pain. Negative for sore throat. Respiratory:  Negative for cough and shortness of breath. Cardiovascular:  Negative for chest pain. Gastrointestinal:  Negative for nausea and vomiting. Genitourinary:  Negative for difficulty urinating. Musculoskeletal:  Negative for arthralgias and myalgias. Skin:  Negative for rash. Allergic/Immunologic: Negative for immunocompromised state. Neurological:  Negative for headaches. PAST MEDICAL HISTORY         Diagnosis Date    ADHD (attention deficit hyperactivity disorder) 1996    Anxiety     Depression 2000    Obesity 1990's       SURGICALHISTORY     Patient  has a past surgical history that includes Tonsillectomy (1988); Adenoidectomy (1988); and [place] tooth extraction. CURRENT MEDICATIONS       Previous Medications    BUPROPION (WELLBUTRIN XL) 150 MG EXTENDED RELEASE TABLET    Take 1 tablet by mouth every morning    LAMOTRIGINE (LAMICTAL) 200 MG TABLET    Take 1 tablet by mouth daily    OMEPRAZOLE (PRILOSEC) 20 MG DELAYED RELEASE CAPSULE    Take 1 capsule by mouth in the morning.     VILAZODONE HCL 40 MG TABS    TAKE 1 TABLET BY MOUTH IN THE MORNING --MUST  TAKE  WITH  FOOD  TO  ACTIVATE       ALLERGIES     Patient is has No Known Allergies. Patients   Immunization History   Administered Date(s) Administered    Influenza Virus Vaccine 10/20/2014    Influenza, AFLURIA (age 1 yrs+), FLUZONE, (age 10 mo+), MDV, 0.5mL 02/14/2017, 10/27/2017    Tdap (Boostrix, Adacel) 10/20/2014       FAMILY HISTORY     Patient's family history includes Alcohol Abuse in his father; Arthritis in his maternal aunt and mother; COPD in his paternal grandfather; No Known Problems in his brother and sister; Stroke in his paternal grandmother; Stroke (age of onset: [de-identified]) in his maternal grandmother. SOCIAL HISTORY     Patient  reports that he has never smoked. He has never used smokeless tobacco. He reports that he does not drink alcohol and does not use drugs. PHYSICAL EXAM     ED TRIAGE VITALS  BP: (!) 172/101, Temp: 97.2 °F (36.2 °C), Heart Rate: 88, Resp: 20, SpO2: 95 %,Estimated body mass index is 61.03 kg/m² as calculated from the following:    Height as of this encounter: 6' (1.829 m). Weight as of this encounter: 450 lb (204.1 kg). ,No LMP for male patient. Physical Exam  Vitals and nursing note reviewed. Constitutional:       General: He is not in acute distress. Appearance: He is morbidly obese. He is not diaphoretic. HENT:      Right Ear: Tympanic membrane is erythematous and bulging. Left Ear: Tympanic membrane and ear canal normal.      Mouth/Throat:      Mouth: Mucous membranes are moist.      Pharynx: Oropharynx is clear. Eyes:      Conjunctiva/sclera:      Right eye: Right conjunctiva is not injected. Left eye: Left conjunctiva is not injected. Pupils: Pupils are equal.   Cardiovascular:      Rate and Rhythm: Normal rate and regular rhythm. Heart sounds: No murmur heard. Pulmonary:      Effort: Pulmonary effort is normal. No respiratory distress. Breath sounds: Normal breath sounds. Musculoskeletal:      Cervical back: Normal range of motion. Skin:     General: Skin is warm.       Findings: No rash. Neurological:      Mental Status: He is alert and oriented to person, place, and time. Psychiatric:         Behavior: Behavior normal.       DIAGNOSTIC RESULTS     Labs:No results found for this visit on 12/12/22. IMAGING:    No orders to display         EKG:      URGENT CARE COURSE:     Vitals:    12/12/22 1110   BP: (!) 172/101   Pulse: 88   Resp: 20   Temp: 97.2 °F (36.2 °C)   TempSrc: Temporal   SpO2: 95%   Weight: (!) 450 lb (204.1 kg)   Height: 6' (1.829 m)       Medications - No data to display         PROCEDURES:  None    FINAL IMPRESSION      1. Right otitis media, unspecified otitis media type          DISPOSITION/ PLAN     Exam is consistent with right otitis media. I discussed with the patient over-the-counter medications that he may take to help with the symptoms and he was given a prescription for amoxicillin. He is advised to rest and hydrate and follow-up on an outpatient basis if symptoms do not improve. Patient is agreeable to the plan as discussed. PATIENT REFERRED TO:  CHARITO Cartwright CNP  [address] / [place] [address]      DISCHARGE MEDICATIONS:  New Prescriptions    AMOXICILLIN (AMOXIL) 875 MG TABLET    Take 1 tablet by mouth 2 times daily for 10 days       Discontinued Medications    No medications on file       Current Discharge Medication List          CHARITO Lunsford CNP    (Please note that portions of this note were completed with a voice recognition program. Efforts were made to edit the dictations but occasionally words are mis-transcribed.)          CHARITO Lunsford CNP  12/12/22 2257

## 2022-12-12 NOTE — ED TRIAGE NOTES
Patient ambulated to room with complaint of pain when he touches his right ear that started yesterday.

## 2022-12-14 ENCOUNTER — TELEPHONE (OUTPATIENT)
Dept: FAMILY MEDICINE CLINIC | Age: 37
End: 2022-12-14

## 2022-12-27 NOTE — TELEPHONE ENCOUNTER
DR Magallanes Setting PT:    Inessa, pts mother, called to r/s appt with Dr Davis Wagner. She states he also needs a refill on Prilosec. Dr Davis Wagner had written for this 8/2022. Pt had a gastroenterologist who started him on this. It has been a year since he was last seen there and  pt is no longer seeing this provider. Pt is in between PCP's as well. The last provider left. Please advise if this med can be called in for pt.

## 2022-12-28 NOTE — TELEPHONE ENCOUNTER
I don't mind prescribing prilosec just one time until he has time to follow up with a PCP. What dosage was he formerly prescribed?

## 2022-12-28 NOTE — TELEPHONE ENCOUNTER
Pt is taking the med as Dr Nohelia Blake rx   Prilosec 20mg delayed release one capsule daily  Pt and mother were extremely appreciative. They are using [facility] on [address] is loaded as Dr Nohelia Blake had written and pt's mother verified.

## 2022-12-29 RX ORDER — OMEPRAZOLE 20 MG/1
20 CAPSULE, DELAYED RELEASE ORAL DAILY
Qty: 30 CAPSULE | Refills: 3 | Status: SHIPPED | OUTPATIENT
Start: 2022-12-29

## 2023-02-01 ENCOUNTER — TELEMEDICINE (OUTPATIENT)
Dept: PSYCHOLOGY | Age: 38
End: 2023-02-01
Payer: COMMERCIAL

## 2023-02-01 DIAGNOSIS — F33.1 MAJOR DEPRESSIVE DISORDER, RECURRENT EPISODE, MODERATE WITH ANXIOUS DISTRESS (HCC): ICD-10-CM

## 2023-02-01 DIAGNOSIS — F34.1 DYSTHYMIA: Primary | ICD-10-CM

## 2023-02-01 PROCEDURE — 90834 PSYTX W PT 45 MINUTES: CPT | Performed by: PSYCHOLOGIST

## 2023-02-01 NOTE — PROGRESS NOTES
Psychotherapy Note  Alvino Ramirez. Oralia Lo Psy.D. Visit Date:  2/1/2023    Patient:  London Pete  YOB: 1985  Chief Complaint:  Follow-up, Depression, and Stress      Duration of session: 40 minutes      S:     Ct said \"things kind of suck right now. \"  When asked to elaborate, he said several people in his household have [place]. He said he didn't get it according to the home testing. Still, he did get sick from something. Mentally and emotionally he said he's not doing well. He displayed a lot of negative self-talk today. He said he won't ever accomplish anything, so he might as well not try. The Wellbutrin made him really angry, so that was discontinued. He's currently taking the Lamictal and Viibryd. We talked a lot about purpose, and jobs he could do that he should pursue. He agreed to try and pursue some things. O:    Appearance    Patient presents as alert, oriented, and cooperative   Appetite abnormal: overeating  Sleep disturbance No  Loss of pleasure Some  Speech    normal rate, normal volume, well articulated and clear and understandable  Mood    Euthymic  Affect    pleasant affect  Thought Process    goal directed, coherent and linear   Insight    Fair  Judgment    Intact  Memory    recent and remote memory intact  Suicide Assessment    Denied SI, plans or intent      A:    1. Dysthymia    2. Major depressive disorder, recurrent episode, moderate with anxious distress (HCC)        Dysthymia seems to be part of his presentation with the MDD episodes coming and going. Overall, ct is stable. Will continue to encourage behavioral activation and use supportive counseling. P:    Virtual visit in 2 mos. London Pete, was evaluated through a synchronous (real-time) audio-video encounter. The patient (or guardian if applicable) is aware that this is a billable service, which includes applicable co-pays.  This Virtual Visit was conducted with patient's (and/or legal guardian's) consent. The visit was conducted pursuant to the emergency declaration under the [facility], [id number] waiver authority and the [employer] General Act. Patient identification was verified, and a caregiver was present when appropriate. The patient was located at Home: [id number] [place] [address],[address]  [facility] [address]  Provider was located at Home ([facility]): [facility]             All questions about treatment plan answered. Patient instructed to go immediately to the emergency room and/or call 911 if any suicidal or homicidal ideations. Patient stated understanding and is agreeable to treatment and crisis plan.         Provider Signature:  Electronically signed by Felecia Valles PSYD on 2/1/2023 at 3:19 PM

## 2023-02-13 RX ORDER — LAMOTRIGINE 200 MG/1
200 TABLET ORAL DAILY
Qty: 30 TABLET | Refills: 0 | Status: SHIPPED | OUTPATIENT
Start: 2023-02-13

## 2023-03-14 RX ORDER — LAMOTRIGINE 200 MG/1
200 TABLET ORAL DAILY
Qty: 30 TABLET | Refills: 0 | Status: SHIPPED | OUTPATIENT
Start: 2023-03-14

## 2023-03-14 RX ORDER — VILAZODONE HYDROCHLORIDE 40 MG/1
TABLET ORAL
Qty: 30 TABLET | Refills: 0 | Status: SHIPPED | OUTPATIENT
Start: 2023-03-14

## 2023-03-14 NOTE — TELEPHONE ENCOUNTER
Ayala Rocha (mother) called into the office stating that Jerry Buys needs both of his medications refilled:     Lamictal 200mg;#30 with 0 refills; last sent on 02/13/2023 and Ross Grit 40mg;#30 with 2 refills;last sent on 11/02/2022. She reports that he is low on both medications and did not realize that he does not have refills. Medications are pending your approval for a 30 day supply with 0 refills; he is scheduled to return on 04/12/2023.

## 2023-04-05 ENCOUNTER — TELEMEDICINE (OUTPATIENT)
Dept: PSYCHOLOGY | Age: 38
End: 2023-04-05
Payer: COMMERCIAL

## 2023-04-05 DIAGNOSIS — F34.1 DYSTHYMIA: Primary | ICD-10-CM

## 2023-04-05 DIAGNOSIS — F33.1 MAJOR DEPRESSIVE DISORDER, RECURRENT EPISODE, MODERATE WITH ANXIOUS DISTRESS (HCC): ICD-10-CM

## 2023-04-05 PROCEDURE — 90832 PSYTX W PT 30 MINUTES: CPT | Performed by: PSYCHOLOGIST

## 2023-04-05 NOTE — PROGRESS NOTES
Psychotherapy Note  Cesar May. Sterling Cortez Psy.D. Visit Date:  4/5/2023    Patient:  Paul Bosch  YOB: 1985  Chief Complaint:  Follow-up, Depression, and Stress      Duration of session:  30 minutes      S:     Ct said he has mixed feelings about how things have been going. He said he hasn't done anything we talked about, saying he doesn't have the money to do things he wants. He chronicled his frustration with things. He's content with the current state of things as opposed to risking more pain and rejection with the potential of being turned down for a job. Used MI and BA to encourage prosocial behaviors. We processed thoughts and feelings about the situation. O:    Appearance    Patient presents as alert, oriented, and cooperative   Appetite abnormal: overeating  Sleep disturbance No  Loss of pleasure Some  Speech    normal rate, normal volume, well articulated and clear and understandable  Mood    Depressed  Affect    dysthymic affect  Thought Process    goal directed, coherent and linear   Insight    Fair  Judgment    Intact  Memory    recent and remote memory intact  Suicide Assessment    Denied SI, plans or intent      A:    1. Dysthymia    2. Major depressive disorder, recurrent episode, moderate with anxious distress (HCC)      Dysthymia seems to be part of his presentation with the MDD episodes coming and going. Overall, ct is stable. Will continue to encourage behavioral activation and use supportive counseling. P:    Virtual visit in 2 mos. Paul Bosch, was evaluated through a synchronous (real-time) audio-video encounter. The patient (or guardian if applicable) is aware that this is a billable service, which includes applicable co-pays. This Virtual Visit was conducted with patient's (and/or legal guardian's) consent.  The visit was conducted pursuant to the emergency declaration under the [facility] waiver authority and

## 2023-04-24 RX ORDER — VILAZODONE HYDROCHLORIDE 40 MG/1
TABLET ORAL
Qty: 30 TABLET | Refills: 0 | OUTPATIENT
Start: 2023-04-24

## 2023-06-08 ENCOUNTER — TELEMEDICINE (OUTPATIENT)
Dept: PSYCHOLOGY | Age: 38
End: 2023-06-08

## 2023-06-08 DIAGNOSIS — F34.1 DYSTHYMIA: Primary | ICD-10-CM

## 2023-06-08 DIAGNOSIS — F33.1 MAJOR DEPRESSIVE DISORDER, RECURRENT EPISODE, MODERATE WITH ANXIOUS DISTRESS (HCC): ICD-10-CM

## 2023-06-08 NOTE — PROGRESS NOTES
Psychotherapy Note  Bill Staton. Helga Severin, Psy.D. Visit Date:  6/8/2023    Patient:  Ranjan Melendez  YOB: 1985  Chief Complaint:  Follow-up, Depression, and Stress      Duration of session:  20 minutes      S:     Ct said he's only been able to sleep 2 hours at a time, and feels tired all the time. Overall not much has changed. He still feels depressed at times and tries to work through it. He said his medical issues right now are the main barrier to him leading the life he wants. He's trying to see several doctors to help him clear things up so he can move forward with his life. Dermatology, JARED, and hypertension are his main concerns now. We used CBT and MI techniques to encourage prosocial behaviors. He did share some desire to pursue a job, which was a great step for him. O:    Appearance    Patient presents as alert, oriented, and cooperative   Appetite abnormal: overeating  Sleep disturbance No  Loss of pleasure Some  Speech    normal rate, normal volume, well articulated and clear and understandable  Mood    Depressed  Affect    dysthymic affect  Thought Process    goal directed, coherent and linear   Insight    Fair  Judgment    Intact  Memory    recent and remote memory intact  Suicide Assessment    Denied SI, plans or intent      A:    1. Dysthymia    2. Major depressive disorder, recurrent episode, moderate with anxious distress (HCC)        Dysthymia seems to be part of his presentation with the MDD episodes coming and going. Overall, ct is stable. Will continue to encourage behavioral activation and use supportive counseling. P:    MyChart visit in 2-3 mos. Ranjan Melendez, was evaluated through a synchronous (real-time) audio-video encounter. The patient (or guardian if applicable) is aware that this is a billable service, which includes applicable co-pays. This Virtual Visit was conducted with patient's (and/or legal guardian's) consent.  Patient identification was

## 2023-08-04 ENCOUNTER — OFFICE VISIT (OUTPATIENT)
Dept: PSYCHOLOGY | Age: 38
End: 2023-08-04
Payer: COMMERCIAL

## 2023-08-04 DIAGNOSIS — F33.1 MAJOR DEPRESSIVE DISORDER, RECURRENT EPISODE, MODERATE WITH ANXIOUS DISTRESS (HCC): ICD-10-CM

## 2023-08-04 DIAGNOSIS — F34.1 DYSTHYMIA: Primary | ICD-10-CM

## 2023-08-04 PROCEDURE — 90832 PSYTX W PT 30 MINUTES: CPT | Performed by: PSYCHOLOGIST

## 2023-08-04 PROCEDURE — 1036F TOBACCO NON-USER: CPT | Performed by: PSYCHOLOGIST

## 2023-08-04 NOTE — PROGRESS NOTES
Psychotherapy Note  Jessica Dior. Karla Rosenthal Psy.D. Visit Date:  8/4/2023    Patient:  Oksana Kaufman  YOB: 1985  Chief Complaint:  Follow-up, Depression, Anxiety, and Stress      Duration of session:  25 minutes      S:     Ct said he's been spacing out a lot, and isn't sleeping well, only a couple hours at a time. Ct said he maybe had Adderall as a child, but not as an adult. He reported his brother takes Adderall and it's really helpful for him. After our discussion today. He's ready for Dr. Zaida Lang to try an ADHD medication now. He also talked about how he struggles with a CPAP, has researched oral devices to help with JARED, and would like to pursue that. He has followup with sleep medicine later this month. He has a new PCP, Dr. Tena Tristan with [facility]. O:    Appearance    Patient presents as alert, oriented, and cooperative   Appetite abnormal: overeating  Sleep disturbance No  Loss of pleasure Some  Speech    normal rate, normal volume, well articulated and clear and understandable  Mood    Depressed  Affect    dysthymic affect  Thought Process    goal directed, coherent and linear   Insight    Fair  Judgment    Intact  Memory    recent and remote memory intact  Suicide Assessment    Denied SI, plans or intent      A:    1. Dysthymia    2. Major depressive disorder, recurrent episode, moderate with anxious distress (HCC)      Dysthymia seems to be part of his presentation with the MDD episodes coming and going. Overall, ct is stable. Will continue to encourage behavioral activation and use supportive counseling. P:    Consult with Dr. Zaida Lang about ct being ready to start an ADHD medication. [facility] visit with ct in 2 mos. All questions about treatment plan answered. Patient instructed to go immediately to the emergency room and/or call 911 if any suicidal or homicidal ideations. Patient stated understanding and is agreeable to treatment and crisis plan.         Provider

## 2023-10-12 ENCOUNTER — TELEMEDICINE (OUTPATIENT)
Dept: PSYCHOLOGY | Age: 38
End: 2023-10-12
Payer: COMMERCIAL

## 2023-10-12 DIAGNOSIS — F34.1 DYSTHYMIA: Primary | ICD-10-CM

## 2023-10-12 DIAGNOSIS — F33.1 MAJOR DEPRESSIVE DISORDER, RECURRENT EPISODE, MODERATE WITH ANXIOUS DISTRESS (HCC): ICD-10-CM

## 2023-10-12 PROCEDURE — 90832 PSYTX W PT 30 MINUTES: CPT | Performed by: PSYCHOLOGIST

## 2023-10-12 NOTE — PROGRESS NOTES
Psychotherapy Note  Seema Osullivan. Fady Wynne Psy.D. Visit Date:  10/12/2023    Patient:  Swapna Dang  YOB: 1985  Chief Complaint:  Follow-up, Depression, Anxiety, and Stress      Duration of session:  20 minutes      S:     Ct said he discovered that he sleeps much better sitting up in bed. Now he's getting about 4 hours at a time instead of 2 hours. He said he doesn't do much at all during the day, and really gets down on himself. He has a lot of thoughts that reflect failure, or not being able to do anything right. We explored and processed these thoughts for a bit, but they are strong and unchanging. O:    Appearance    Patient presents as alert, oriented, and cooperative   Appetite abnormal: overeating  Sleep disturbance No  Loss of pleasure Some  Speech    normal rate, normal volume, well articulated and clear and understandable  Mood    Depressed  Affect    dysthymic affect  Thought Process    goal directed, coherent and linear   Insight    Fair  Judgment    Intact  Memory    recent and remote memory intact  Suicide Assessment    Denied SI, plans or intent      A:    1. Dysthymia    2. Major depressive disorder, recurrent episode, moderate with anxious distress (HCC)        Dysthymia seems to be part of his presentation with the MDD episodes coming and going. Overall, ct is stable. Will continue to encourage behavioral activation and use supportive counseling. P:    MyChart visit in 2-3 mos. Swapna Dang, was evaluated through a synchronous (real-time) audio-video encounter. The patient (or guardian if applicable) is aware that this is a billable service, which includes applicable co-pays. This Virtual Visit was conducted with patient's (and/or legal guardian's) consent. Patient identification was verified, and a caregiver was present when appropriate.    The patient was located at Home: [address] [facility]  [address] [facility] [address] [id number]  Provider was located at Home ([address] [facility]):

## 2023-11-02 ENCOUNTER — TELEMEDICINE (OUTPATIENT)
Dept: PSYCHIATRY | Age: 38
End: 2023-11-02
Payer: COMMERCIAL

## 2023-11-02 DIAGNOSIS — G47.00 INSOMNIA, UNSPECIFIED TYPE: ICD-10-CM

## 2023-11-02 DIAGNOSIS — F90.8 ADHD, ADULT RESIDUAL TYPE: ICD-10-CM

## 2023-11-02 DIAGNOSIS — F39 MOOD DISORDER (HCC): Primary | ICD-10-CM

## 2023-11-02 DIAGNOSIS — F41.1 GENERALIZED ANXIETY DISORDER: ICD-10-CM

## 2023-11-02 PROCEDURE — 99213 OFFICE O/P EST LOW 20 MIN: CPT | Performed by: PSYCHIATRY & NEUROLOGY

## 2023-11-02 PROCEDURE — G8427 DOCREV CUR MEDS BY ELIG CLIN: HCPCS | Performed by: PSYCHIATRY & NEUROLOGY

## 2023-11-02 RX ORDER — LAMOTRIGINE 200 MG/1
200 TABLET ORAL DAILY
Qty: 30 TABLET | Refills: 2 | Status: SHIPPED | OUTPATIENT
Start: 2023-11-02

## 2023-11-02 RX ORDER — VILAZODONE HYDROCHLORIDE 40 MG/1
TABLET ORAL
Qty: 30 TABLET | Refills: 2 | Status: SHIPPED | OUTPATIENT
Start: 2023-11-02

## 2023-11-02 NOTE — PROGRESS NOTES
[address] PSYCHIATRY  [address] [facility] [address] [phone]    Progress Note    Patient:  Erasmo Han  YOB: 1985  PCP:  Franck Jaeger, CHARITO - CNP  Visit Date:  11/2/2023      Chief Complaint   Patient presents with    Follow-up    Medication Check    Anxiety    Insomnia    ADHD    Mood Swings       SUBJECTIVE:      Erasmo Han, a 41 y.o. male, presents for a follow up visit. He presents alone. Mood \"ups and downs, mostly okay\". Trouble with executive function. Feels limited by anxiety and pessimistic attitude. Expects things won't work out. Prefers the certainty of choosing failure than trying and it not working. Rejection sensitivity. Notes disability was denied, had a year of trying to appeal that. Can't imagine being hired. \"I have so little to offer. \" Self-deprecating. No experience. Mental health concerns. Why would they pick me? He would like a job. Notes the benefits it would provide financially. We discussed option to work with [employer] agency that helps folks with disabilities to get work. Likes his current med regimen. Keeping him feel more stable. Mood depressed at times due to \"life situations\". Considered trying ADHD medication noting focus has felt a little better lately. Hesitant to try med changes noting issues in the past. I suggested addressing ADHD might improve his confidence to work. Sleep issues and fatigue. Got a new CPAP mask that makes it easier for him to use. Declines to make any med changes today. Med Trials: Seroquel (drowsiness, but worsened sleep), Lamictal, Viibryd, Prozac, Ritalin, Adderall, Wellbutrin (irritable)    OBJECTIVE:  Vitals: There were no vitals taken for this visit.     MENTAL STATUS EXAM:    GENERAL  Build: Overweight    Hygiene:  Appropriate in casual dress   SENSORIUM Orientation: Place, Person, Time, & Situation     Consciousness:

## 2023-11-16 ENCOUNTER — TELEMEDICINE (OUTPATIENT)
Dept: PSYCHOLOGY | Age: 38
End: 2023-11-16
Payer: COMMERCIAL

## 2023-11-16 DIAGNOSIS — F34.1 DYSTHYMIA: Primary | ICD-10-CM

## 2023-11-16 DIAGNOSIS — F33.1 MAJOR DEPRESSIVE DISORDER, RECURRENT EPISODE, MODERATE WITH ANXIOUS DISTRESS (HCC): ICD-10-CM

## 2023-11-16 PROCEDURE — 90832 PSYTX W PT 30 MINUTES: CPT | Performed by: PSYCHOLOGIST

## 2023-11-16 NOTE — PROGRESS NOTES
Psychotherapy Note  Jackelyn Montejo. Arlene Fam Psy.D. Visit Date:  11/16/2023    Patient:  Declan Caba  YOB: 1985  Chief Complaint:  Follow-up and Depression      Duration of session:  30 minutes      S:     Ct said the CPAP hasn't worked, and he slept better just sitting up. He said nothing has changed, and he doesn't feel he can take the steps to change despite our best efforts. He feels scared about trying a new medication, based on negative experiences with past medication. We talked about getting a part-time job, maybe at [employer] or [employer], and he sounded interested, but said he just doesn't ever follow through. We google mapped it, and it would be a 15 min walk from his house to the [facility] which would not only give him a job, but a good walk to and from as well. He said he'll think about it. 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 We talked about failure for a while, and he maintains that failing is worse than not trying. He said he wouldn't change his mind on this issue. Finally, after some going back and forth, he said someday when he's more ready he would try to do something. O:    Appearance    Patient presents as alert, oriented, and cooperative   Appetite abnormal: overeating  Sleep disturbance No  Loss of pleasure Some  Speech    normal rate, normal volume, well articulated and clear and understandable  Mood    Depressed  Affect    dysthymic affect  Thought Process    goal directed, coherent and linear   Insight    Fair  Judgment    Intact  Memory    recent and remote memory intact  Suicide Assessment    Denied SI, plans or intent      A:    1. Dysthymia    2.  Major depressive disorder, recurrent episode, moderate with anxious distress ([address])        Dysthymia seems to be part of his

## 2024-01-18 ENCOUNTER — TELEMEDICINE (OUTPATIENT)
Dept: PSYCHOLOGY | Age: 39
End: 2024-01-18
Payer: COMMERCIAL

## 2024-01-18 DIAGNOSIS — F34.1 DYSTHYMIA: Primary | ICD-10-CM

## 2024-01-18 DIAGNOSIS — F33.1 MAJOR DEPRESSIVE DISORDER, RECURRENT EPISODE, MODERATE WITH ANXIOUS DISTRESS (HCC): ICD-10-CM

## 2024-01-18 PROCEDURE — 90832 PSYTX W PT 30 MINUTES: CPT | Performed by: PSYCHOLOGIST

## 2024-01-18 NOTE — PROGRESS NOTES
Psychotherapy Note  Avery Neely Psy.D.  Visit Date:  1/18/2024    Patient:  Natanael Rockwell  YOB: 1985  Chief Complaint:  Follow-up, Depression, Anxiety, and Stress      Duration of session:  25 minutes      S:     Ct said his mom had her hip surgery, and it went well.  She's recovering nicely.  Ct said he decided he couldn't work since his mom had surgery and she can't drive him to work.  He also said he isn't sleeping well, often staying up to 8a, and sleeping until 3p.  He said his crazy sleep schedule is another reason he can't hold a job.  He added that he has thought about the online streaming opportunity but his brain won't let him do much about it.  He maintained that not doing much of anything is better for his MH than taking a risk and failing at it.  He reported some SI, but said he wouldn't do anything to act upon it, denying a plan or intent.        O:    Appearance    Patient presents as alert, oriented, and cooperative   Appetite abnormal: overeating  Sleep disturbance No  Loss of pleasure Some  Speech    normal rate, normal volume, well articulated and clear and understandable  Mood    Depressed  Affect    depressed affect  Thought Process    goal directed, coherent and linear   Insight    Fair  Judgment    Intact  Memory    recent and remote memory intact  Suicide Assessment    Reported some SI today, but denied plans or intent, was able to verbally contract for safety      A:    1. Dysthymia    2. Major depressive disorder, recurrent episode, moderate with anxious distress (HCC)        Dysthymia seems to be part of his presentation with the MDD episodes coming and going.  Overall, ct is stable.  Will continue to encourage behavioral activation and use supportive counseling.       Overall, ct has maintained his status at times in the pre-contemplative stage of change, then after some prodding moves into the contemplative stage.  Then, when left to his own devices talks himself

## 2024-02-01 ENCOUNTER — TELEMEDICINE (OUTPATIENT)
Dept: PSYCHIATRY | Age: 39
End: 2024-02-01
Payer: COMMERCIAL

## 2024-02-01 DIAGNOSIS — G47.00 INSOMNIA, UNSPECIFIED TYPE: ICD-10-CM

## 2024-02-01 DIAGNOSIS — F41.1 GENERALIZED ANXIETY DISORDER: ICD-10-CM

## 2024-02-01 DIAGNOSIS — F90.8 ADHD, ADULT RESIDUAL TYPE: ICD-10-CM

## 2024-02-01 DIAGNOSIS — F39 MOOD DISORDER (HCC): Primary | ICD-10-CM

## 2024-02-01 PROCEDURE — G8427 DOCREV CUR MEDS BY ELIG CLIN: HCPCS | Performed by: PSYCHIATRY & NEUROLOGY

## 2024-02-01 PROCEDURE — 99213 OFFICE O/P EST LOW 20 MIN: CPT | Performed by: PSYCHIATRY & NEUROLOGY

## 2024-02-01 RX ORDER — LAMOTRIGINE 200 MG/1
200 TABLET ORAL DAILY
Qty: 30 TABLET | Refills: 2 | Status: SHIPPED | OUTPATIENT
Start: 2024-02-01

## 2024-02-01 RX ORDER — VILAZODONE HYDROCHLORIDE 40 MG/1
TABLET ORAL
Qty: 30 TABLET | Refills: 2 | Status: SHIPPED | OUTPATIENT
Start: 2024-02-01

## 2024-02-01 NOTE — PROGRESS NOTES
[facility] PHYSICIANS [place] [facility]  [facility] PSYCHIATRY  [address] [facility] [address] [phone]    Progress Note    Patient:  Natanael Rockwell  YOB: 1985  PCP:  Antoinette Lira APRN - CNP  Visit Date:  2/1/2024      Chief Complaint   Patient presents with    Follow-up    Medication Check    ADHD    Anxiety    Insomnia    Mood Swings       SUBJECTIVE:      Natanael Rockwell, a 38 y.o. male, presents for a follow up visit.      He presents alone.   Notes mood has been more \"angry and irritable\" the last few weeks citing world events. Denies seasonal worsening of mood noting he prefers cold weather.  Melatonin sometimes causes bad dreams. Took it last night and awoke feeling worse. Better now.   Glad he can remember dreams now. Got CPAP and sleeping full nights now.   Energy is better.  Prior to CPAP was \"nodding off\".   Trouble getting in touch with friends and wonders if they care.   Some trouble with focus and executive function. Trouble with motivation.   Declines to make any med changes today.       Med Trials: Seroquel (drowsiness, but worsened sleep), Lamictal, Viibryd, Prozac, Ritalin, Adderall, Wellbutrin (irritable)    OBJECTIVE:  Vitals: There were no vitals taken for this visit.    MENTAL STATUS EXAM:    GENERAL  Build: Overweight    Hygiene:  Appropriate   SENSORIUM Orientation: Place, Person, Time, & Situation     Consciousness: Alert    ATTENTION   Focused    RELATEDNESS  Cooperative    EYE CONTACT   Good   PSYCHOMOTOR  Normal   SPEECH Volume: Normal     Rate: Normal rate and calm tone    Amplitude: Within normal limits   MOOD  \"Angry and irritable\"     AFFECT Range: Limited , social smile present   THOUGHT Process:  Goal-Directed     Content: no evidence of psychosis    COGNITION Insight: Good    Judgement:  Intact    MEMORY  Intact    INTELLIGENCE  Average     Sleep:  see above    Nutrition: Overweight   ADL: Satisfactory   Mobility/Gait:

## 2024-03-14 ENCOUNTER — TELEMEDICINE (OUTPATIENT)
Dept: PSYCHOLOGY | Age: 39
End: 2024-03-14
Payer: COMMERCIAL

## 2024-03-14 DIAGNOSIS — F34.1 DYSTHYMIA: Primary | ICD-10-CM

## 2024-03-14 PROCEDURE — 90832 PSYTX W PT 30 MINUTES: CPT | Performed by: PSYCHOLOGIST

## 2024-03-14 NOTE — PROGRESS NOTES
guardian if applicable) is aware that this is a billable service, which includes applicable co-pays. This Virtual Visit was conducted with patient's (and/or legal guardian's) consent. Patient identification was verified, and a caregiver was present when appropriate.   The patient was located at Home: [facility] [place] [address]  Provider was located at [facility] [place]             All questions about treatment plan answered.Patient instructed to go immediately to the emergency room and/or call 911 if any suicidal or homicidal ideations. Patient stated understanding and is agreeable to treatment and crisis plan.        Provider Signature:  Electronically signed by Avery Neely PSYD on 3/14/2024 at 3:00 PM

## 2024-05-01 ENCOUNTER — TELEMEDICINE (OUTPATIENT)
Dept: PSYCHIATRY | Age: 39
End: 2024-05-01
Payer: COMMERCIAL

## 2024-05-01 DIAGNOSIS — G47.00 INSOMNIA, UNSPECIFIED TYPE: ICD-10-CM

## 2024-05-01 DIAGNOSIS — F41.1 GENERALIZED ANXIETY DISORDER: ICD-10-CM

## 2024-05-01 DIAGNOSIS — F90.8 ADHD, ADULT RESIDUAL TYPE: ICD-10-CM

## 2024-05-01 DIAGNOSIS — F39 MOOD DISORDER (HCC): Primary | ICD-10-CM

## 2024-05-01 PROCEDURE — 99214 OFFICE O/P EST MOD 30 MIN: CPT | Performed by: PSYCHIATRY & NEUROLOGY

## 2024-05-01 PROCEDURE — G8427 DOCREV CUR MEDS BY ELIG CLIN: HCPCS | Performed by: PSYCHIATRY & NEUROLOGY

## 2024-05-01 RX ORDER — LAMOTRIGINE 200 MG/1
200 TABLET ORAL DAILY
Qty: 30 TABLET | Refills: 2 | Status: SHIPPED | OUTPATIENT
Start: 2024-05-01

## 2024-05-01 RX ORDER — VILAZODONE HYDROCHLORIDE 40 MG/1
TABLET ORAL
Qty: 30 TABLET | Refills: 2 | Status: SHIPPED | OUTPATIENT
Start: 2024-05-01

## 2024-05-01 RX ORDER — BUSPIRONE HYDROCHLORIDE 10 MG/1
10 TABLET ORAL 2 TIMES DAILY
Qty: 60 TABLET | Refills: 2 | Status: SHIPPED | OUTPATIENT
Start: 2024-05-01

## 2024-05-01 NOTE — PROGRESS NOTES
[facility] [place] [facility]  [facility] PSYCHIATRY  [address] [facility] [id number] [phone]    Progress Note    Patient:  Natanael Rockwell  YOB: 1985  PCP:  Antoinette Lira APRN - CNP  Visit Date:  5/1/2024      Chief Complaint   Patient presents with    Follow-up    Medication Check    Anxiety    Depression    ADHD    Insomnia       SUBJECTIVE:      Natanael Rockwell, a 38 y.o. male, presents for a follow up visit.      He presents alone.   Had more anxiety/stress related to a computer issue that brought up financial stress noting his monitor is expensive. Was able to fix the problem. Sleep was worse during that time and was having heart palpitations with the anxiety.   Mood \"same as usual\". Feeling depressed.   Notes desire to get a job. Anxiety holds him back, leaves him feeling stuck. Hasn't applied to one place he's interested in.   Feels hopeless.   \"Pretty much anyone else will be better at anything than me\". Very self critical.   Feeling not good enough. Notes lack of experience.   Trouble with sleep schedule. Hard to stay on regular schedule.   Worries about physical demands of a job.   CPAP mask worn out and not fitting well. Waiting on a replacement this month.   Intrusive, self-loathing thoughts if he isn't focused on something. Tends to keep himself busy due to that.   Discussed tx options and we agree to start Buspar for anxiety.  No SI or psychosis.        Med Trials: Seroquel (drowsiness, but worsened sleep), Lamictal, Viibryd, Prozac, Ritalin, Adderall, Wellbutrin (irritable)    OBJECTIVE:  Vitals: There were no vitals taken for this visit.    MENTAL STATUS EXAM:    GENERAL  Build: Overweight    Hygiene:  Appropriate in casual dress   SENSORIUM Orientation: Place, Person, Time, & Situation     Consciousness: Alert    ATTENTION   Focused    RELATEDNESS  Cooperative    EYE CONTACT   Good   PSYCHOMOTOR  Normal   SPEECH Volume: Normal

## 2024-05-16 ENCOUNTER — TELEMEDICINE (OUTPATIENT)
Dept: PSYCHOLOGY | Age: 39
End: 2024-05-16
Payer: COMMERCIAL

## 2024-05-16 DIAGNOSIS — F33.1 MAJOR DEPRESSIVE DISORDER, RECURRENT EPISODE, MODERATE WITH ANXIOUS DISTRESS (HCC): ICD-10-CM

## 2024-05-16 DIAGNOSIS — F34.1 DYSTHYMIA: Primary | ICD-10-CM

## 2024-05-16 PROCEDURE — 90832 PSYTX W PT 30 MINUTES: CPT | Performed by: PSYCHOLOGIST

## 2024-05-16 NOTE — PROGRESS NOTES
Psychotherapy Note  Avery Neely Psy.D.  Visit Date:  5/16/2024    Patient:  Natanael Rockwell  YOB: 1985  Chief Complaint:  Follow-up, Depression, and Stress      Duration of session:  20 minutes      S:     Ct said he's now on Buspar and it helps some.  He said he still hasn't done the things we've discussed in terms of getting a job yet.  He wants to apply to the [employer] but doesn't have the motivation to do so.  We processed thoughts and feelings about the situation.  He didn't have much he wanted to talk about today, since life hasn't changed much.  It's notable he seemed more calm, and less down on himself today than usual.        O:    Appearance    Patient presents as alert, oriented, and cooperative   Appetite abnormal: overeating  Sleep disturbance No  Loss of pleasure Some  Speech    normal rate, normal volume, well articulated and clear and understandable  Mood    Depressed  Affect    depressed affect  Thought Process    goal directed, coherent and linear   Insight    Fair  Judgment    Intact  Memory    recent and remote memory intact  Suicide Assessment    Reported some SI today, but denied plans or intent, was able to verbally contract for safety      A:    1. Dysthymia    2. Major depressive disorder, recurrent episode, moderate with anxious distress (HCC)        Dysthymia seems to be part of his presentation with the MDD episodes coming and going.  Overall, ct is stable.  Will continue to encourage behavioral activation and use supportive counseling.       Overall, ct has maintained his status at times in the pre-contemplative stage of change, then after some prodding moves into the contemplative stage.  Then, when left to his own devices talks himself out of it.  He has a lot of thoughts that reflect failure, or not being able to do anything right.  As much as we've tried CBT and MI techniques, they are strong and unchanging.     P:    MyChart visit in 1-2 mos.    Natanael JALEEL

## 2024-07-18 ENCOUNTER — TELEMEDICINE (OUTPATIENT)
Dept: PSYCHOLOGY | Age: 39
End: 2024-07-18
Payer: COMMERCIAL

## 2024-07-18 DIAGNOSIS — F34.1 DYSTHYMIA: Primary | ICD-10-CM

## 2024-07-18 DIAGNOSIS — F33.1 MAJOR DEPRESSIVE DISORDER, RECURRENT EPISODE, MODERATE WITH ANXIOUS DISTRESS (HCC): ICD-10-CM

## 2024-07-18 PROCEDURE — 90832 PSYTX W PT 30 MINUTES: CPT | Performed by: PSYCHOLOGIST

## 2024-07-18 NOTE — PROGRESS NOTES
Psychotherapy Note  Avery Neely Psy.D.  Visit Date:  7/18/2024    Patient:  Natanael Rockwell  YOB: 1985  Chief Complaint:  Follow-up, Depression, and Stress      Duration of session:  16 minutes      S:     Ct said he hasn't been sleeping well, and is now on trazadone.  He's still on Buspar.  He said not much has changed.  He doesn't have much motivation to do much of anything except being on the computer.  He just wants to sleep better, and has hope life will get better.  We processed thoughts and feelings about the situation.        O:    Appearance    Patient presents as alert, oriented, and cooperative   Appetite abnormal: overeating  Sleep disturbance No  Loss of pleasure Some  Speech    normal rate, normal volume, well articulated and clear and understandable  Mood    Depressed  Affect    depressed affect  Thought Process    goal directed, coherent and linear   Insight    Fair  Judgment    Intact  Memory    recent and remote memory intact  Suicide Assessment    Reported some SI today, but denied plans or intent, was able to verbally contract for safety      A:    1. Dysthymia    2. Major depressive disorder, recurrent episode, moderate with anxious distress (HCC)        Dysthymia seems to be part of his presentation with the MDD episodes coming and going.  Overall, ct is stable.  Will continue to encourage behavioral activation and use supportive counseling.       Overall, ct has maintained his status at times in the pre-contemplative stage of change, then after some prodding moves into the contemplative stage.  Then, when left to his own devices talks himself out of it.  He has a lot of thoughts that reflect failure, or not being able to do anything right.  As much as we've tried CBT and MI techniques, they are strong and unchanging.     P:    MyChart visit in 2-3 mos.    Natanael Rockwell, was evaluated through a synchronous (real-time) audio-video encounter. The patient (or guardian if

## 2024-07-31 RX ORDER — LAMOTRIGINE 200 MG/1
200 TABLET ORAL DAILY
Qty: 30 TABLET | Refills: 1 | Status: SHIPPED | OUTPATIENT
Start: 2024-07-31

## 2024-07-31 RX ORDER — BUSPIRONE HYDROCHLORIDE 10 MG/1
10 TABLET ORAL 2 TIMES DAILY
Qty: 60 TABLET | Refills: 1 | Status: SHIPPED | OUTPATIENT
Start: 2024-07-31

## 2024-07-31 RX ORDER — VILAZODONE HYDROCHLORIDE 40 MG/1
TABLET ORAL
Qty: 30 TABLET | Refills: 1 | Status: SHIPPED | OUTPATIENT
Start: 2024-07-31

## 2024-07-31 NOTE — TELEPHONE ENCOUNTER
Natanael Rockwell mother called requesting a refill on the following medications:  Requested Prescriptions     Pending Prescriptions Disp Refills    busPIRone (BUSPAR) 10 MG tablet 60 tablet 1     Sig: Take 1 tablet by mouth 2 times daily    lamoTRIgine (LAMICTAL) 200 MG tablet 30 tablet 1     Sig: Take 1 tablet by mouth daily     She apologized for today's missed appointment and states patient forgot to set his alarm.     Date of last visit: 5/1/2024  Date of next visit (if applicable):9/17/2024  Pharmacy Name: Olivia Ruelas MA

## 2024-09-10 ENCOUNTER — TELEPHONE (OUTPATIENT)
Dept: PSYCHIATRY | Age: 39
End: 2024-09-10

## 2024-09-18 RX ORDER — LAMOTRIGINE 200 MG/1
200 TABLET ORAL DAILY
Qty: 30 TABLET | Refills: 1 | Status: SHIPPED | OUTPATIENT
Start: 2024-09-18

## 2024-09-18 RX ORDER — BUSPIRONE HYDROCHLORIDE 10 MG/1
10 TABLET ORAL 2 TIMES DAILY
Qty: 60 TABLET | Refills: 1 | Status: SHIPPED | OUTPATIENT
Start: 2024-09-18

## 2024-09-18 RX ORDER — VILAZODONE HYDROCHLORIDE 40 MG/1
TABLET ORAL
Qty: 30 TABLET | Refills: 1 | Status: SHIPPED | OUTPATIENT
Start: 2024-09-18

## 2024-09-26 ENCOUNTER — TELEMEDICINE (OUTPATIENT)
Dept: PSYCHOLOGY | Age: 39
End: 2024-09-26
Payer: COMMERCIAL

## 2024-09-26 DIAGNOSIS — F34.1 DYSTHYMIA: Primary | ICD-10-CM

## 2024-09-26 DIAGNOSIS — F33.1 MAJOR DEPRESSIVE DISORDER, RECURRENT EPISODE, MODERATE WITH ANXIOUS DISTRESS (HCC): ICD-10-CM

## 2024-09-26 PROCEDURE — 90832 PSYTX W PT 30 MINUTES: CPT | Performed by: PSYCHOLOGIST

## 2024-10-30 ENCOUNTER — TELEMEDICINE (OUTPATIENT)
Dept: PSYCHIATRY | Age: 39
End: 2024-10-30
Payer: COMMERCIAL

## 2024-10-30 DIAGNOSIS — F39 MOOD DISORDER (HCC): ICD-10-CM

## 2024-10-30 DIAGNOSIS — F90.8 ADHD, ADULT RESIDUAL TYPE: ICD-10-CM

## 2024-10-30 DIAGNOSIS — G47.00 INSOMNIA, UNSPECIFIED TYPE: ICD-10-CM

## 2024-10-30 DIAGNOSIS — F41.1 GENERALIZED ANXIETY DISORDER: Primary | ICD-10-CM

## 2024-10-30 PROCEDURE — 99214 OFFICE O/P EST MOD 30 MIN: CPT | Performed by: PSYCHIATRY & NEUROLOGY

## 2024-10-30 PROCEDURE — G8427 DOCREV CUR MEDS BY ELIG CLIN: HCPCS | Performed by: PSYCHIATRY & NEUROLOGY

## 2024-10-30 RX ORDER — TRAZODONE HYDROCHLORIDE 50 MG/1
50 TABLET, FILM COATED ORAL NIGHTLY
COMMUNITY
Start: 2024-08-12

## 2024-10-30 RX ORDER — VILAZODONE HYDROCHLORIDE 40 MG/1
TABLET ORAL
Qty: 30 TABLET | Refills: 2 | Status: SHIPPED | OUTPATIENT
Start: 2024-10-30

## 2024-10-30 RX ORDER — BUSPIRONE HYDROCHLORIDE 10 MG/1
20 TABLET ORAL 2 TIMES DAILY
Qty: 120 TABLET | Refills: 2 | Status: SHIPPED | OUTPATIENT
Start: 2024-10-30

## 2024-10-30 RX ORDER — LAMOTRIGINE 200 MG/1
200 TABLET ORAL DAILY
Qty: 30 TABLET | Refills: 2 | Status: SHIPPED | OUTPATIENT
Start: 2024-10-30

## 2024-10-30 ASSESSMENT — PATIENT HEALTH QUESTIONNAIRE - PHQ9
6. FEELING BAD ABOUT YOURSELF - OR THAT YOU ARE A FAILURE OR HAVE LET YOURSELF OR YOUR FAMILY DOWN: NEARLY EVERY DAY
SUM OF ALL RESPONSES TO PHQ9 QUESTIONS 1 & 2: 6
3. TROUBLE FALLING OR STAYING ASLEEP: SEVERAL DAYS
9. THOUGHTS THAT YOU WOULD BE BETTER OFF DEAD, OR OF HURTING YOURSELF: SEVERAL DAYS
1. LITTLE INTEREST OR PLEASURE IN DOING THINGS: NEARLY EVERY DAY
8. MOVING OR SPEAKING SO SLOWLY THAT OTHER PEOPLE COULD HAVE NOTICED. OR THE OPPOSITE - BEING SO FIDGETY OR RESTLESS THAT YOU HAVE BEEN MOVING AROUND A LOT MORE THAN USUAL: NOT AT ALL
1. LITTLE INTEREST OR PLEASURE IN DOING THINGS: NEARLY EVERY DAY
SUM OF ALL RESPONSES TO PHQ QUESTIONS 1-9: 14
10. IF YOU CHECKED OFF ANY PROBLEMS, HOW DIFFICULT HAVE THESE PROBLEMS MADE IT FOR YOU TO DO YOUR WORK, TAKE CARE OF THINGS AT HOME, OR GET ALONG WITH OTHER PEOPLE: VERY DIFFICULT
7. TROUBLE CONCENTRATING ON THINGS, SUCH AS READING THE NEWSPAPER OR WATCHING TELEVISION: NOT AT ALL
5. POOR APPETITE OR OVEREATING: NOT AT ALL
10. IF YOU CHECKED OFF ANY PROBLEMS, HOW DIFFICULT HAVE THESE PROBLEMS MADE IT FOR YOU TO DO YOUR WORK, TAKE CARE OF THINGS AT HOME, OR GET ALONG WITH OTHER PEOPLE: VERY DIFFICULT
6. FEELING BAD ABOUT YOURSELF - OR THAT YOU ARE A FAILURE OR HAVE LET YOURSELF OR YOUR FAMILY DOWN: NEARLY EVERY DAY
3. TROUBLE FALLING OR STAYING ASLEEP: SEVERAL DAYS
4. FEELING TIRED OR HAVING LITTLE ENERGY: NEARLY EVERY DAY
SUM OF ALL RESPONSES TO PHQ QUESTIONS 1-9: 14
8. MOVING OR SPEAKING SO SLOWLY THAT OTHER PEOPLE COULD HAVE NOTICED. OR THE OPPOSITE, BEING SO FIGETY OR RESTLESS THAT YOU HAVE BEEN MOVING AROUND A LOT MORE THAN USUAL: NOT AT ALL
9. THOUGHTS THAT YOU WOULD BE BETTER OFF DEAD, OR OF HURTING YOURSELF: SEVERAL DAYS
5. POOR APPETITE OR OVEREATING: NOT AT ALL
4. FEELING TIRED OR HAVING LITTLE ENERGY: NEARLY EVERY DAY
SUM OF ALL RESPONSES TO PHQ QUESTIONS 1-9: 14
SUM OF ALL RESPONSES TO PHQ QUESTIONS 1-9: 13
2. FEELING DOWN, DEPRESSED OR HOPELESS: NEARLY EVERY DAY
7. TROUBLE CONCENTRATING ON THINGS, SUCH AS READING THE NEWSPAPER OR WATCHING TELEVISION: NOT AT ALL
SUM OF ALL RESPONSES TO PHQ QUESTIONS 1-9: 14
2. FEELING DOWN, DEPRESSED OR HOPELESS: NEARLY EVERY DAY

## 2024-10-30 ASSESSMENT — ANXIETY QUESTIONNAIRES
3. WORRYING TOO MUCH ABOUT DIFFERENT THINGS: NEARLY EVERY DAY
4. TROUBLE RELAXING: NEARLY EVERY DAY
1. FEELING NERVOUS, ANXIOUS, OR ON EDGE: NEARLY EVERY DAY
7. FEELING AFRAID AS IF SOMETHING AWFUL MIGHT HAPPEN: NEARLY EVERY DAY
1. FEELING NERVOUS, ANXIOUS, OR ON EDGE: NEARLY EVERY DAY
4. TROUBLE RELAXING: NEARLY EVERY DAY
6. BECOMING EASILY ANNOYED OR IRRITABLE: NEARLY EVERY DAY
IF YOU CHECKED OFF ANY PROBLEMS ON THIS QUESTIONNAIRE, HOW DIFFICULT HAVE THESE PROBLEMS MADE IT FOR YOU TO DO YOUR WORK, TAKE CARE OF THINGS AT HOME, OR GET ALONG WITH OTHER PEOPLE: VERY DIFFICULT
5. BEING SO RESTLESS THAT IT IS HARD TO SIT STILL: NOT AT ALL
6. BECOMING EASILY ANNOYED OR IRRITABLE: NEARLY EVERY DAY
5. BEING SO RESTLESS THAT IT IS HARD TO SIT STILL: NOT AT ALL
2. NOT BEING ABLE TO STOP OR CONTROL WORRYING: NEARLY EVERY DAY
3. WORRYING TOO MUCH ABOUT DIFFERENT THINGS: NEARLY EVERY DAY
7. FEELING AFRAID AS IF SOMETHING AWFUL MIGHT HAPPEN: NEARLY EVERY DAY
2. NOT BEING ABLE TO STOP OR CONTROL WORRYING: NEARLY EVERY DAY
IF YOU CHECKED OFF ANY PROBLEMS ON THIS QUESTIONNAIRE, HOW DIFFICULT HAVE THESE PROBLEMS MADE IT FOR YOU TO DO YOUR WORK, TAKE CARE OF THINGS AT HOME, OR GET ALONG WITH OTHER PEOPLE: VERY DIFFICULT
GAD7 TOTAL SCORE: 18

## 2024-10-30 ASSESSMENT — COLUMBIA-SUICIDE SEVERITY RATING SCALE - C-SSRS
6. IN YOUR LIFETIME, HAVE YOU EVER DONE ANYTHING, STARTED TO DO ANYTHING, OR PREPARED TO DO ANYTHING TO END YOUR LIFE?: NO
1. IN THE PAST MONTH, HAVE YOU WISHED YOU WERE DEAD OR WISHED YOU COULD GO TO SLEEP AND NOT WAKE UP?: YES
2. IN THE PAST MONTH, HAVE YOU ACTUALLY HAD ANY THOUGHTS OF KILLING YOURSELF?: NO

## 2024-10-30 NOTE — PROGRESS NOTES
[facility] [place] [facility]  [facility] PSYCHIATRY  [address] [facility] [address] [phone]    Progress Note    Patient:  Natanael Rockwell  YOB: 1985  PCP:  Antoinette Lira, CHARITO - CNP  Visit Date:  10/30/2024      Chief Complaint   Patient presents with    Follow-up    Medication Check    Depression    Anxiety    Insomnia    ADHD       SUBJECTIVE:      Natanael Rockwell, a 39 y.o. male, presents for a follow up visit.      He presents alone.   Notes Buspar with anxiety benefit helping \"a lot\".   Ongoing issues with anxiety despite that.   Could tell he was feeling more agitated/anxious between doses.   \"I'm a little autistic\". Sensitive to touch at times.   Sleep is better. Taking trazodone Rx by PCP.   Passive thoughts of death but denies any suicidal intent or plan. Hopeless feeling like he doesn't have a future.    Mood \"genoveva down\" about it at times.   Notes at age 39 can't drive, doesn't have a job. \"I don't feel like I can.\" Always tired \"worn out\". When \"I try to fix things I fail and make it worse.\"  Focus isn't too bad. Doesn't feel lack of trying related to ADHD.   Feels like a failure. Notes he continues working on these topics in therapy.  No psychosis.   Discussed tx options and we agree to increase Buspar for anxiety.        Med Trials: Seroquel (drowsiness, but worsened sleep), Lamictal, Viibryd, Prozac, Ritalin, Adderall, Wellbutrin (irritable)    OBJECTIVE:  Vitals: There were no vitals taken for this visit.    MENTAL STATUS EXAM:    GENERAL  Build: Overweight    Hygiene:  Appropriate    SENSORIUM Orientation: Place, Person, Time, & Situation     Consciousness: Alert    ATTENTION   Focused    RELATEDNESS  Cooperative    EYE CONTACT   Good   PSYCHOMOTOR  Normal   SPEECH Volume: Normal     Rate: Normal rate and somewhat down tone    Amplitude: Within normal limits   MOOD  \"Genoveva down\"     AFFECT Range: Limited, mood congruent, slight social

## 2024-12-19 ENCOUNTER — TELEMEDICINE (OUTPATIENT)
Dept: PSYCHOLOGY | Age: 39
End: 2024-12-19
Payer: COMMERCIAL

## 2024-12-19 DIAGNOSIS — F33.1 MAJOR DEPRESSIVE DISORDER, RECURRENT EPISODE, MODERATE WITH ANXIOUS DISTRESS (HCC): ICD-10-CM

## 2024-12-19 DIAGNOSIS — F34.1 DYSTHYMIA: Primary | ICD-10-CM

## 2024-12-19 PROCEDURE — 90832 PSYTX W PT 30 MINUTES: CPT | Performed by: PSYCHOLOGIST

## 2024-12-19 NOTE — PROGRESS NOTES
mike Rockwell, was evaluated through a synchronous (real-time) audio-video encounter. The patient (or guardian if applicable) is aware that this is a billable service, which includes applicable co-pays. This Virtual Visit was conducted with patient's (and/or legal guardian's) consent. Patient identification was verified, and a caregiver was present when appropriate.   The patient was located at Home: [address]  [place] [address]  Provider was located at Home (Appt Dept State): [place]  Confirm you are appropriately licensed, registered, or certified to deliver care in the state where the patient is located as indicated above. If you are not or unsure, please re-schedule the visit: Yes, I confirm.            All questions about treatment plan answered.Patient instructed to go immediately to the emergency room and/or call 911 if any suicidal or homicidal ideations. Patient stated understanding and is agreeable to treatment and crisis plan.        Provider Signature:  Electronically signed by Avery Neely PSYD on 12/20/2024 at 9:16 AM

## 2025-01-06 ASSESSMENT — ANXIETY QUESTIONNAIRES
5. BEING SO RESTLESS THAT IT IS HARD TO SIT STILL: NOT AT ALL
4. TROUBLE RELAXING: NEARLY EVERY DAY
2. NOT BEING ABLE TO STOP OR CONTROL WORRYING: MORE THAN HALF THE DAYS
1. FEELING NERVOUS, ANXIOUS, OR ON EDGE: NEARLY EVERY DAY
7. FEELING AFRAID AS IF SOMETHING AWFUL MIGHT HAPPEN: MORE THAN HALF THE DAYS
3. WORRYING TOO MUCH ABOUT DIFFERENT THINGS: NEARLY EVERY DAY
6. BECOMING EASILY ANNOYED OR IRRITABLE: MORE THAN HALF THE DAYS
5. BEING SO RESTLESS THAT IT IS HARD TO SIT STILL: NOT AT ALL
3. WORRYING TOO MUCH ABOUT DIFFERENT THINGS: NEARLY EVERY DAY
7. FEELING AFRAID AS IF SOMETHING AWFUL MIGHT HAPPEN: MORE THAN HALF THE DAYS
4. TROUBLE RELAXING: NEARLY EVERY DAY
6. BECOMING EASILY ANNOYED OR IRRITABLE: MORE THAN HALF THE DAYS
GAD7 TOTAL SCORE: 15
IF YOU CHECKED OFF ANY PROBLEMS ON THIS QUESTIONNAIRE, HOW DIFFICULT HAVE THESE PROBLEMS MADE IT FOR YOU TO DO YOUR WORK, TAKE CARE OF THINGS AT HOME, OR GET ALONG WITH OTHER PEOPLE: VERY DIFFICULT
1. FEELING NERVOUS, ANXIOUS, OR ON EDGE: NEARLY EVERY DAY
2. NOT BEING ABLE TO STOP OR CONTROL WORRYING: MORE THAN HALF THE DAYS
IF YOU CHECKED OFF ANY PROBLEMS ON THIS QUESTIONNAIRE, HOW DIFFICULT HAVE THESE PROBLEMS MADE IT FOR YOU TO DO YOUR WORK, TAKE CARE OF THINGS AT HOME, OR GET ALONG WITH OTHER PEOPLE: VERY DIFFICULT

## 2025-01-06 ASSESSMENT — PATIENT HEALTH QUESTIONNAIRE - PHQ9
SUM OF ALL RESPONSES TO PHQ9 QUESTIONS 1 & 2: 5
2. FEELING DOWN, DEPRESSED OR HOPELESS: NEARLY EVERY DAY
SUM OF ALL RESPONSES TO PHQ QUESTIONS 1-9: 15
8. MOVING OR SPEAKING SO SLOWLY THAT OTHER PEOPLE COULD HAVE NOTICED. OR THE OPPOSITE - BEING SO FIDGETY OR RESTLESS THAT YOU HAVE BEEN MOVING AROUND A LOT MORE THAN USUAL: NOT AT ALL
9. THOUGHTS THAT YOU WOULD BE BETTER OFF DEAD, OR OF HURTING YOURSELF: SEVERAL DAYS
7. TROUBLE CONCENTRATING ON THINGS, SUCH AS READING THE NEWSPAPER OR WATCHING TELEVISION: SEVERAL DAYS
10. IF YOU CHECKED OFF ANY PROBLEMS, HOW DIFFICULT HAVE THESE PROBLEMS MADE IT FOR YOU TO DO YOUR WORK, TAKE CARE OF THINGS AT HOME, OR GET ALONG WITH OTHER PEOPLE: VERY DIFFICULT
6. FEELING BAD ABOUT YOURSELF - OR THAT YOU ARE A FAILURE OR HAVE LET YOURSELF OR YOUR FAMILY DOWN: NEARLY EVERY DAY
7. TROUBLE CONCENTRATING ON THINGS, SUCH AS READING THE NEWSPAPER OR WATCHING TELEVISION: SEVERAL DAYS
4. FEELING TIRED OR HAVING LITTLE ENERGY: NEARLY EVERY DAY
SUM OF ALL RESPONSES TO PHQ QUESTIONS 1-9: 15
5. POOR APPETITE OR OVEREATING: SEVERAL DAYS
SUM OF ALL RESPONSES TO PHQ QUESTIONS 1-9: 15
SUM OF ALL RESPONSES TO PHQ QUESTIONS 1-9: 14
10. IF YOU CHECKED OFF ANY PROBLEMS, HOW DIFFICULT HAVE THESE PROBLEMS MADE IT FOR YOU TO DO YOUR WORK, TAKE CARE OF THINGS AT HOME, OR GET ALONG WITH OTHER PEOPLE: VERY DIFFICULT
5. POOR APPETITE OR OVEREATING: SEVERAL DAYS
2. FEELING DOWN, DEPRESSED OR HOPELESS: NEARLY EVERY DAY
4. FEELING TIRED OR HAVING LITTLE ENERGY: NEARLY EVERY DAY
SUM OF ALL RESPONSES TO PHQ QUESTIONS 1-9: 15
3. TROUBLE FALLING OR STAYING ASLEEP: SEVERAL DAYS
6. FEELING BAD ABOUT YOURSELF - OR THAT YOU ARE A FAILURE OR HAVE LET YOURSELF OR YOUR FAMILY DOWN: NEARLY EVERY DAY
9. THOUGHTS THAT YOU WOULD BE BETTER OFF DEAD, OR OF HURTING YOURSELF: SEVERAL DAYS
1. LITTLE INTEREST OR PLEASURE IN DOING THINGS: MORE THAN HALF THE DAYS
3. TROUBLE FALLING OR STAYING ASLEEP: SEVERAL DAYS
1. LITTLE INTEREST OR PLEASURE IN DOING THINGS: MORE THAN HALF THE DAYS
8. MOVING OR SPEAKING SO SLOWLY THAT OTHER PEOPLE COULD HAVE NOTICED. OR THE OPPOSITE, BEING SO FIGETY OR RESTLESS THAT YOU HAVE BEEN MOVING AROUND A LOT MORE THAN USUAL: NOT AT ALL

## 2025-01-07 ENCOUNTER — TELEMEDICINE (OUTPATIENT)
Dept: PSYCHIATRY | Age: 40
End: 2025-01-07
Payer: COMMERCIAL

## 2025-01-07 DIAGNOSIS — F39 MOOD DISORDER (HCC): Primary | ICD-10-CM

## 2025-01-07 DIAGNOSIS — F41.1 GENERALIZED ANXIETY DISORDER: ICD-10-CM

## 2025-01-07 DIAGNOSIS — F90.8 ADHD, ADULT RESIDUAL TYPE: ICD-10-CM

## 2025-01-07 DIAGNOSIS — G47.00 INSOMNIA, UNSPECIFIED TYPE: ICD-10-CM

## 2025-01-07 PROCEDURE — 99213 OFFICE O/P EST LOW 20 MIN: CPT | Performed by: PSYCHIATRY & NEUROLOGY

## 2025-01-07 PROCEDURE — G8427 DOCREV CUR MEDS BY ELIG CLIN: HCPCS | Performed by: PSYCHIATRY & NEUROLOGY

## 2025-01-07 RX ORDER — BUSPIRONE HYDROCHLORIDE 10 MG/1
20 TABLET ORAL 2 TIMES DAILY
Qty: 120 TABLET | Refills: 2 | Status: SHIPPED | OUTPATIENT
Start: 2025-01-07

## 2025-01-07 RX ORDER — VILAZODONE HYDROCHLORIDE 40 MG/1
TABLET ORAL
Qty: 30 TABLET | Refills: 2 | Status: SHIPPED | OUTPATIENT
Start: 2025-01-07

## 2025-01-07 RX ORDER — LAMOTRIGINE 200 MG/1
200 TABLET ORAL DAILY
Qty: 30 TABLET | Refills: 2 | Status: SHIPPED | OUTPATIENT
Start: 2025-01-07

## 2025-01-07 NOTE — PROGRESS NOTES
[facility] [place] [facility]  [facility] PSYCHIATRY  [address] [facility] [address] [phone]    Progress Note    Patient:  Natanael Rockwell  YOB: 1985  PCP:  Antoinette Lira APRN - CNP  Visit Date:  1/7/2025      Chief Complaint   Patient presents with    Follow-up    Medication Check    Anxiety    Depression    ADHD    Insomnia       SUBJECTIVE:      Natanael Rockwell, a 39 y.o. male, presents for a follow up visit.      He presents alone.   Mood \"Okay.\"  Holidays were more stressful.    Making slow progress toward \"improvements in my life\". Noting feeling \"better overall\".  Using Buspar 20 mg BID noting some benefit for anxiety.   Less intrusive thoughts and worrying.   Can tell when he's due for next dose. Prefers to use BID instead of TID.  Sleep is okay using trazodone Rx by PCP.   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 Feeling nervous about spending money to get it going.   Enjoys it.Satisfying finishing a project. Focus \"okay\" noting he hasn't had too much trouble staying on task.  Some fatigue and anhedonia.  Taking vit D and B supplements.   No suicidal intent or plan. Has chronic passive thoughts of death. No psychosis.   Declines to make any medication changes today.        Med Trials: Seroquel (drowsiness, but worsened sleep), Lamictal, Viibryd, Prozac, Ritalin, Adderall, Wellbutrin (irritable)    OBJECTIVE:  Vitals: There were no vitals taken for this visit.    MENTAL STATUS EXAM:    GENERAL  Build: Overweight    Hygiene:  Appropriate in casual dress   SENSORIUM Orientation: Place, Person, Time, & Situation     Consciousness: Alert    ATTENTION   Focused    RELATEDNESS  Cooperative    EYE CONTACT   Good   PSYCHOMOTOR  Normal   SPEECH Volume: Normal     Rate: Normal rate and at times somewhat down tone    Amplitude: Within normal limits   MOOD  \"okay\"     AFFECT Range: Limited, mood congruent, social smile

## 2025-03-27 ENCOUNTER — TELEMEDICINE (OUTPATIENT)
Dept: PSYCHOLOGY | Age: 40
End: 2025-03-27
Payer: COMMERCIAL

## 2025-03-27 DIAGNOSIS — F33.1 MAJOR DEPRESSIVE DISORDER, RECURRENT EPISODE, MODERATE WITH ANXIOUS DISTRESS (HCC): ICD-10-CM

## 2025-03-27 DIAGNOSIS — F34.1 DYSTHYMIA: Primary | ICD-10-CM

## 2025-03-27 PROCEDURE — 90832 PSYTX W PT 30 MINUTES: CPT | Performed by: PSYCHOLOGIST

## 2025-03-27 NOTE — PROGRESS NOTES
Psychotherapy Note  Avery Neely Psy.D.  Visit Date:  3/27/2025    Patient:  Natanael Rockwell  YOB: 1985  Chief Complaint:  Follow-up, Depression, Anxiety, and Stress      Duration of session:  25 minutes      S:     Ct got the manual from the DMV and started reading it, to start thinking about driving.  His motivation remains low.  He talked about challenges with switching gears and starting something.  He has started modeling projects and painting figurines.  He wants to be able to exercise a little more so he can be in better shape so he can work a job.  He still has aspirations to work but not yet.  We talked about how he might need more energy, and that a consult with Dr. Butler about potentially a trial of something like Strattera may be beneficial for him.        O:    Appearance    Patient presents as alert, oriented, and cooperative   Appetite abnormal: overeating  Sleep disturbance No  Loss of pleasure Some  Speech    normal rate, normal volume, well articulated and clear and understandable  Mood    Depressed  Affect    depressed affect  Thought Process    goal directed, coherent and linear   Insight    Fair  Judgment    Intact  Memory    recent and remote memory intact  Suicide Assessment    Reported some SI today, but denied plans or intent, was able to verbally contract for safety      A:    1. Dysthymia    2. Major depressive disorder, recurrent episode, moderate with anxious distress (HCC)        Dysthymia seems to be part of his presentation with the MDD episodes coming and going.  Overall, ct is stable.  Will continue to encourage behavioral activation and use supportive counseling.       Overall, ct has maintained his status at times in the pre-contemplative stage of change, then after some prodding moves into the contemplative stage.  Then, when left to his own devices talks himself out of it.  He has a lot of thoughts that reflect failure, or not being able to do anything

## 2025-04-08 ENCOUNTER — TELEMEDICINE (OUTPATIENT)
Dept: PSYCHIATRY | Age: 40
End: 2025-04-08
Payer: COMMERCIAL

## 2025-04-08 DIAGNOSIS — F90.8 ADHD, ADULT RESIDUAL TYPE: Primary | ICD-10-CM

## 2025-04-08 DIAGNOSIS — G47.00 INSOMNIA, UNSPECIFIED TYPE: ICD-10-CM

## 2025-04-08 DIAGNOSIS — F41.1 GENERALIZED ANXIETY DISORDER: ICD-10-CM

## 2025-04-08 DIAGNOSIS — F39 MOOD DISORDER: ICD-10-CM

## 2025-04-08 PROCEDURE — 99214 OFFICE O/P EST MOD 30 MIN: CPT | Performed by: PSYCHIATRY & NEUROLOGY

## 2025-04-08 PROCEDURE — G8427 DOCREV CUR MEDS BY ELIG CLIN: HCPCS | Performed by: PSYCHIATRY & NEUROLOGY

## 2025-04-08 RX ORDER — DEXTROAMPHETAMINE SACCHARATE, AMPHETAMINE ASPARTATE MONOHYDRATE, DEXTROAMPHETAMINE SULFATE AND AMPHETAMINE SULFATE 2.5; 2.5; 2.5; 2.5 MG/1; MG/1; MG/1; MG/1
10 CAPSULE, EXTENDED RELEASE ORAL DAILY
Qty: 30 CAPSULE | Refills: 0 | Status: SHIPPED | OUTPATIENT
Start: 2025-04-08 | End: 2025-05-08

## 2025-04-08 RX ORDER — VILAZODONE HYDROCHLORIDE 40 MG/1
TABLET ORAL
Qty: 30 TABLET | Refills: 2 | Status: SHIPPED | OUTPATIENT
Start: 2025-04-08

## 2025-04-08 RX ORDER — LAMOTRIGINE 200 MG/1
200 TABLET ORAL DAILY
Qty: 30 TABLET | Refills: 2 | Status: SHIPPED | OUTPATIENT
Start: 2025-04-08

## 2025-04-08 RX ORDER — BUSPIRONE HYDROCHLORIDE 10 MG/1
20 TABLET ORAL 2 TIMES DAILY
Qty: 120 TABLET | Refills: 2 | Status: SHIPPED | OUTPATIENT
Start: 2025-04-08

## 2025-04-08 ASSESSMENT — PATIENT HEALTH QUESTIONNAIRE - PHQ9
6. FEELING BAD ABOUT YOURSELF - OR THAT YOU ARE A FAILURE OR HAVE LET YOURSELF OR YOUR FAMILY DOWN: NEARLY EVERY DAY
SUM OF ALL RESPONSES TO PHQ QUESTIONS 1-9: 13
9. THOUGHTS THAT YOU WOULD BE BETTER OFF DEAD, OR OF HURTING YOURSELF: SEVERAL DAYS
10. IF YOU CHECKED OFF ANY PROBLEMS, HOW DIFFICULT HAVE THESE PROBLEMS MADE IT FOR YOU TO DO YOUR WORK, TAKE CARE OF THINGS AT HOME, OR GET ALONG WITH OTHER PEOPLE: EXTREMELY DIFFICULT
2. FEELING DOWN, DEPRESSED OR HOPELESS: NEARLY EVERY DAY
3. TROUBLE FALLING OR STAYING ASLEEP: SEVERAL DAYS
7. TROUBLE CONCENTRATING ON THINGS, SUCH AS READING THE NEWSPAPER OR WATCHING TELEVISION: SEVERAL DAYS
1. LITTLE INTEREST OR PLEASURE IN DOING THINGS: SEVERAL DAYS
8. MOVING OR SPEAKING SO SLOWLY THAT OTHER PEOPLE COULD HAVE NOTICED. OR THE OPPOSITE - BEING SO FIDGETY OR RESTLESS THAT YOU HAVE BEEN MOVING AROUND A LOT MORE THAN USUAL: NOT AT ALL
4. FEELING TIRED OR HAVING LITTLE ENERGY: NEARLY EVERY DAY
SUM OF ALL RESPONSES TO PHQ QUESTIONS 1-9: 13
7. TROUBLE CONCENTRATING ON THINGS, SUCH AS READING THE NEWSPAPER OR WATCHING TELEVISION: SEVERAL DAYS
5. POOR APPETITE OR OVEREATING: NOT AT ALL
2. FEELING DOWN, DEPRESSED OR HOPELESS: NEARLY EVERY DAY
4. FEELING TIRED OR HAVING LITTLE ENERGY: NEARLY EVERY DAY
5. POOR APPETITE OR OVEREATING: NOT AT ALL
1. LITTLE INTEREST OR PLEASURE IN DOING THINGS: SEVERAL DAYS
SUM OF ALL RESPONSES TO PHQ QUESTIONS 1-9: 13
9. THOUGHTS THAT YOU WOULD BE BETTER OFF DEAD, OR OF HURTING YOURSELF: SEVERAL DAYS
10. IF YOU CHECKED OFF ANY PROBLEMS, HOW DIFFICULT HAVE THESE PROBLEMS MADE IT FOR YOU TO DO YOUR WORK, TAKE CARE OF THINGS AT HOME, OR GET ALONG WITH OTHER PEOPLE: EXTREMELY DIFFICULT
8. MOVING OR SPEAKING SO SLOWLY THAT OTHER PEOPLE COULD HAVE NOTICED. OR THE OPPOSITE, BEING SO FIGETY OR RESTLESS THAT YOU HAVE BEEN MOVING AROUND A LOT MORE THAN USUAL: NOT AT ALL
SUM OF ALL RESPONSES TO PHQ QUESTIONS 1-9: 13
6. FEELING BAD ABOUT YOURSELF - OR THAT YOU ARE A FAILURE OR HAVE LET YOURSELF OR YOUR FAMILY DOWN: NEARLY EVERY DAY
SUM OF ALL RESPONSES TO PHQ QUESTIONS 1-9: 12
3. TROUBLE FALLING OR STAYING ASLEEP: SEVERAL DAYS

## 2025-04-08 ASSESSMENT — ANXIETY QUESTIONNAIRES
6. BECOMING EASILY ANNOYED OR IRRITABLE: NEARLY EVERY DAY
3. WORRYING TOO MUCH ABOUT DIFFERENT THINGS: SEVERAL DAYS
3. WORRYING TOO MUCH ABOUT DIFFERENT THINGS: SEVERAL DAYS
IF YOU CHECKED OFF ANY PROBLEMS ON THIS QUESTIONNAIRE, HOW DIFFICULT HAVE THESE PROBLEMS MADE IT FOR YOU TO DO YOUR WORK, TAKE CARE OF THINGS AT HOME, OR GET ALONG WITH OTHER PEOPLE: VERY DIFFICULT
5. BEING SO RESTLESS THAT IT IS HARD TO SIT STILL: NOT AT ALL
1. FEELING NERVOUS, ANXIOUS, OR ON EDGE: NEARLY EVERY DAY
1. FEELING NERVOUS, ANXIOUS, OR ON EDGE: NEARLY EVERY DAY
5. BEING SO RESTLESS THAT IT IS HARD TO SIT STILL: NOT AT ALL
7. FEELING AFRAID AS IF SOMETHING AWFUL MIGHT HAPPEN: NOT AT ALL
IF YOU CHECKED OFF ANY PROBLEMS ON THIS QUESTIONNAIRE, HOW DIFFICULT HAVE THESE PROBLEMS MADE IT FOR YOU TO DO YOUR WORK, TAKE CARE OF THINGS AT HOME, OR GET ALONG WITH OTHER PEOPLE: VERY DIFFICULT
4. TROUBLE RELAXING: SEVERAL DAYS
6. BECOMING EASILY ANNOYED OR IRRITABLE: NEARLY EVERY DAY
7. FEELING AFRAID AS IF SOMETHING AWFUL MIGHT HAPPEN: NOT AT ALL
2. NOT BEING ABLE TO STOP OR CONTROL WORRYING: MORE THAN HALF THE DAYS
GAD7 TOTAL SCORE: 10
2. NOT BEING ABLE TO STOP OR CONTROL WORRYING: MORE THAN HALF THE DAYS
4. TROUBLE RELAXING: SEVERAL DAYS

## 2025-04-08 ASSESSMENT — COLUMBIA-SUICIDE SEVERITY RATING SCALE - C-SSRS
2. IN THE PAST MONTH, HAVE YOU ACTUALLY HAD ANY THOUGHTS OF KILLING YOURSELF?: NO
6. IN YOUR LIFETIME, HAVE YOU EVER DONE ANYTHING, STARTED TO DO ANYTHING, OR PREPARED TO DO ANYTHING TO END YOUR LIFE?: NO
1. IN THE PAST MONTH, HAVE YOU WISHED YOU WERE DEAD OR WISHED YOU COULD GO TO SLEEP AND NOT WAKE UP?: YES

## 2025-04-08 NOTE — PROGRESS NOTES
RELATEDNESS  Cooperative    EYE CONTACT   Good   PSYCHOMOTOR  Normal   SPEECH Volume: Normal     Rate: Normal rate and down tone    Amplitude: Within normal limits   MOOD  Dysphoric    AFFECT Range: Limited, mood congruent, slight social smile present   THOUGHT Process:  Goal-Directed     Content: no evidence of psychosis    COGNITION Insight: Good    Judgement:  Intact    MEMORY  Intact    INTELLIGENCE  Average         4/8/2025     1:20 PM 1/6/2025    12:18 PM 10/30/2024     1:08 PM   PHQ-9    Little interest or pleasure in doing things 1 2 3   Feeling down, depressed, or hopeless 3 3 3   Trouble falling or staying asleep, or sleeping too much 1 1 1   Feeling tired or having little energy 3 3 3   Poor appetite or overeating 0 1 0   Feeling bad about yourself - or that you are a failure or have let yourself or your family down 3 3 3   Trouble concentrating on things, such as reading the newspaper or watching television 1 1 0   Moving or speaking so slowly that other people could have noticed. Or the opposite - being so fidgety or restless that you have been moving around a lot more than usual 0 0 0   Thoughts that you would be better off dead, or of hurting yourself in some way 1 1 1   PHQ-2 Score 4  5  6    PHQ-9 Total Score 13  15  14    If you checked off any problems, how difficult have these problems made it for you to do your work, take care of things at home, or get along with other people? 3 2 2       Patient-reported         4/8/2025     1:17 PM 1/6/2025    12:15 PM 10/30/2024     1:05 PM   WATSON-7 SCREENING   Feeling nervous, anxious, or on edge Nearly every day Nearly every day Nearly every day   Not being able to stop or control worrying More than half the days More than half the days Nearly every day   Worrying too much about different things Several days Nearly every day Nearly every day   Trouble relaxing Several days Nearly every day Nearly every day   Being so restless that it is hard to sit still

## 2025-05-12 ASSESSMENT — ANXIETY QUESTIONNAIRES
6. BECOMING EASILY ANNOYED OR IRRITABLE: MORE THAN HALF THE DAYS
4. TROUBLE RELAXING: MORE THAN HALF THE DAYS
2. NOT BEING ABLE TO STOP OR CONTROL WORRYING: MORE THAN HALF THE DAYS
3. WORRYING TOO MUCH ABOUT DIFFERENT THINGS: MORE THAN HALF THE DAYS
IF YOU CHECKED OFF ANY PROBLEMS ON THIS QUESTIONNAIRE, HOW DIFFICULT HAVE THESE PROBLEMS MADE IT FOR YOU TO DO YOUR WORK, TAKE CARE OF THINGS AT HOME, OR GET ALONG WITH OTHER PEOPLE: VERY DIFFICULT
IF YOU CHECKED OFF ANY PROBLEMS ON THIS QUESTIONNAIRE, HOW DIFFICULT HAVE THESE PROBLEMS MADE IT FOR YOU TO DO YOUR WORK, TAKE CARE OF THINGS AT HOME, OR GET ALONG WITH OTHER PEOPLE: VERY DIFFICULT
4. TROUBLE RELAXING: MORE THAN HALF THE DAYS
7. FEELING AFRAID AS IF SOMETHING AWFUL MIGHT HAPPEN: SEVERAL DAYS
1. FEELING NERVOUS, ANXIOUS, OR ON EDGE: MORE THAN HALF THE DAYS
5. BEING SO RESTLESS THAT IT IS HARD TO SIT STILL: SEVERAL DAYS
6. BECOMING EASILY ANNOYED OR IRRITABLE: MORE THAN HALF THE DAYS
3. WORRYING TOO MUCH ABOUT DIFFERENT THINGS: MORE THAN HALF THE DAYS
GAD7 TOTAL SCORE: 12
2. NOT BEING ABLE TO STOP OR CONTROL WORRYING: MORE THAN HALF THE DAYS
7. FEELING AFRAID AS IF SOMETHING AWFUL MIGHT HAPPEN: SEVERAL DAYS
5. BEING SO RESTLESS THAT IT IS HARD TO SIT STILL: SEVERAL DAYS
1. FEELING NERVOUS, ANXIOUS, OR ON EDGE: MORE THAN HALF THE DAYS

## 2025-05-12 ASSESSMENT — PATIENT HEALTH QUESTIONNAIRE - PHQ9
3. TROUBLE FALLING OR STAYING ASLEEP: SEVERAL DAYS
4. FEELING TIRED OR HAVING LITTLE ENERGY: MORE THAN HALF THE DAYS
7. TROUBLE CONCENTRATING ON THINGS, SUCH AS READING THE NEWSPAPER OR WATCHING TELEVISION: NOT AT ALL
10. IF YOU CHECKED OFF ANY PROBLEMS, HOW DIFFICULT HAVE THESE PROBLEMS MADE IT FOR YOU TO DO YOUR WORK, TAKE CARE OF THINGS AT HOME, OR GET ALONG WITH OTHER PEOPLE: VERY DIFFICULT
2. FEELING DOWN, DEPRESSED OR HOPELESS: MORE THAN HALF THE DAYS
SUM OF ALL RESPONSES TO PHQ QUESTIONS 1-9: 10
1. LITTLE INTEREST OR PLEASURE IN DOING THINGS: MORE THAN HALF THE DAYS
5. POOR APPETITE OR OVEREATING: NOT AT ALL
SUM OF ALL RESPONSES TO PHQ QUESTIONS 1-9: 10
3. TROUBLE FALLING OR STAYING ASLEEP: SEVERAL DAYS
SUM OF ALL RESPONSES TO PHQ QUESTIONS 1-9: 10
9. THOUGHTS THAT YOU WOULD BE BETTER OFF DEAD, OR OF HURTING YOURSELF: SEVERAL DAYS
SUM OF ALL RESPONSES TO PHQ QUESTIONS 1-9: 9
4. FEELING TIRED OR HAVING LITTLE ENERGY: MORE THAN HALF THE DAYS
9. THOUGHTS THAT YOU WOULD BE BETTER OFF DEAD, OR OF HURTING YOURSELF: SEVERAL DAYS
7. TROUBLE CONCENTRATING ON THINGS, SUCH AS READING THE NEWSPAPER OR WATCHING TELEVISION: NOT AT ALL
6. FEELING BAD ABOUT YOURSELF - OR THAT YOU ARE A FAILURE OR HAVE LET YOURSELF OR YOUR FAMILY DOWN: MORE THAN HALF THE DAYS
6. FEELING BAD ABOUT YOURSELF - OR THAT YOU ARE A FAILURE OR HAVE LET YOURSELF OR YOUR FAMILY DOWN: MORE THAN HALF THE DAYS
2. FEELING DOWN, DEPRESSED OR HOPELESS: MORE THAN HALF THE DAYS
1. LITTLE INTEREST OR PLEASURE IN DOING THINGS: MORE THAN HALF THE DAYS
10. IF YOU CHECKED OFF ANY PROBLEMS, HOW DIFFICULT HAVE THESE PROBLEMS MADE IT FOR YOU TO DO YOUR WORK, TAKE CARE OF THINGS AT HOME, OR GET ALONG WITH OTHER PEOPLE: VERY DIFFICULT
8. MOVING OR SPEAKING SO SLOWLY THAT OTHER PEOPLE COULD HAVE NOTICED. OR THE OPPOSITE, BEING SO FIGETY OR RESTLESS THAT YOU HAVE BEEN MOVING AROUND A LOT MORE THAN USUAL: NOT AT ALL
SUM OF ALL RESPONSES TO PHQ QUESTIONS 1-9: 10
8. MOVING OR SPEAKING SO SLOWLY THAT OTHER PEOPLE COULD HAVE NOTICED. OR THE OPPOSITE - BEING SO FIDGETY OR RESTLESS THAT YOU HAVE BEEN MOVING AROUND A LOT MORE THAN USUAL: NOT AT ALL
5. POOR APPETITE OR OVEREATING: NOT AT ALL

## 2025-05-13 ENCOUNTER — TELEMEDICINE (OUTPATIENT)
Dept: PSYCHIATRY | Age: 40
End: 2025-05-13
Payer: COMMERCIAL

## 2025-05-13 DIAGNOSIS — G47.00 INSOMNIA, UNSPECIFIED TYPE: ICD-10-CM

## 2025-05-13 DIAGNOSIS — F39 MOOD DISORDER: ICD-10-CM

## 2025-05-13 DIAGNOSIS — F90.8 ADHD, ADULT RESIDUAL TYPE: Primary | ICD-10-CM

## 2025-05-13 DIAGNOSIS — F41.1 GENERALIZED ANXIETY DISORDER: ICD-10-CM

## 2025-05-13 PROCEDURE — G8427 DOCREV CUR MEDS BY ELIG CLIN: HCPCS | Performed by: PSYCHIATRY & NEUROLOGY

## 2025-05-13 PROCEDURE — 99214 OFFICE O/P EST MOD 30 MIN: CPT | Performed by: PSYCHIATRY & NEUROLOGY

## 2025-05-13 RX ORDER — VILAZODONE HYDROCHLORIDE 40 MG/1
40 TABLET ORAL DAILY
Qty: 30 TABLET | Refills: 2 | Status: SHIPPED | OUTPATIENT
Start: 2025-05-13

## 2025-05-13 RX ORDER — VILAZODONE HYDROCHLORIDE 10 MG/1
10 TABLET ORAL DAILY
Qty: 30 TABLET | Refills: 2 | Status: SHIPPED | OUTPATIENT
Start: 2025-05-13

## 2025-05-13 RX ORDER — BUSPIRONE HYDROCHLORIDE 10 MG/1
20 TABLET ORAL 2 TIMES DAILY
Qty: 120 TABLET | Refills: 2 | Status: SHIPPED | OUTPATIENT
Start: 2025-05-13

## 2025-05-13 RX ORDER — LAMOTRIGINE 200 MG/1
200 TABLET ORAL DAILY
Qty: 30 TABLET | Refills: 2 | Status: SHIPPED | OUTPATIENT
Start: 2025-05-13

## 2025-05-13 NOTE — PROGRESS NOTES
[facility] [place] [facility]  [facility] PSYCHIATRY  [address] [facility] [address] [phone]    Progress Note    Patient:  Natanael Rockwell  YOB: 1985  PCP:  Antoinette Lira, APRN - CNP  Visit Date:  5/13/2025      Chief Complaint   Patient presents with    Follow-up    Medication Check    Anxiety    Depression    ADHD    Insomnia       SUBJECTIVE:      Natanael Rockwell, a 39 y.o. male, presents for a follow up visit.      He presents alone.   Mood \"Not great.\"  World events \"make me not want to be in it\". Doesn't want to discuss it any further.  Not suicidal \"I'm not going to kill myself.\"  Feeling \"trapped doing nothing, achieving nothing\". Took Adderall 1-2 weeks stopped it d/t HA which improved. Blames weather as well for HA. 10 mg dose didn't have any effect.   \"Bad feelings that are bothering me\". Feeling more depressed \"ever since the world started going to shit\".  At current dose of medications mood relatively better but still not good.    Anxiety worse \"sometimes\" tends to be before his 2nd dose of Buspar. We discussed potential to increase to TID in the future.   Has been off Abx noting need to see Derm for that.   PCP had Rx trazodone, still taking that, sleep is okay.   Discussed tx options and we agree to increase Viibryd for anxiety and mood. He's aware higher dose will be beyond FDA max. Advised he can try Adderall XR 20 mg dose and monitor for HA.   No SI or psychosis.        Med Trials: Seroquel (drowsiness, but worsened sleep), Lamictal, Viibryd, Prozac, Ritalin, Adderall, Wellbutrin (irritable)    OBJECTIVE:  Vitals: There were no vitals taken for this visit.    MENTAL STATUS EXAM:    GENERAL  Build: Overweight    Hygiene:  Appropriate in casual dress   SENSORIUM Orientation: Place, Person, Time, & Situation     Consciousness: Alert    ATTENTION   Focused    RELATEDNESS  Guarded    EYE CONTACT   Good   PSYCHOMOTOR  Normal   SPEECH

## 2025-05-21 ENCOUNTER — TELEPHONE (OUTPATIENT)
Dept: PSYCHIATRY | Age: 40
End: 2025-05-21

## 2025-05-21 DIAGNOSIS — F90.8 ADHD, ADULT RESIDUAL TYPE: ICD-10-CM

## 2025-05-21 RX ORDER — DEXTROAMPHETAMINE SACCHARATE, AMPHETAMINE ASPARTATE MONOHYDRATE, DEXTROAMPHETAMINE SULFATE AND AMPHETAMINE SULFATE 5; 5; 5; 5 MG/1; MG/1; MG/1; MG/1
20 CAPSULE, EXTENDED RELEASE ORAL DAILY
Qty: 30 CAPSULE | Refills: 0 | Status: SHIPPED | OUTPATIENT
Start: 2025-05-21 | End: 2025-06-20

## 2025-05-21 NOTE — TELEPHONE ENCOUNTER
Patient's mother left a voicemail stating provider advised for patient to take two capsules of Adderall 10 mg XR for a total of Adderall 20 mg XR. She reports patient is doing well taking the Adderall 20 mg XR. She requests Rx to be changed to 20 mg XR capsule. Please advise.       Last visit- 5/13/2025  Next visit- 6/18/2025

## 2025-06-06 ENCOUNTER — HOSPITAL ENCOUNTER (EMERGENCY)
Age: 40
Discharge: HOME | End: 2025-06-06
Payer: COMMERCIAL

## 2025-06-06 VITALS
SYSTOLIC BLOOD PRESSURE: 147 MMHG | BODY MASS INDEX: 61.03 KG/M2 | WEIGHT: 315 LBS | RESPIRATION RATE: 20 BRPM | DIASTOLIC BLOOD PRESSURE: 92 MMHG | HEART RATE: 91 BPM | TEMPERATURE: 98.8 F | OXYGEN SATURATION: 97 %

## 2025-06-06 DIAGNOSIS — H60.502 ACUTE OTITIS EXTERNA OF LEFT EAR, UNSPECIFIED TYPE: Primary | ICD-10-CM

## 2025-06-06 PROCEDURE — 99213 OFFICE O/P EST LOW 20 MIN: CPT

## 2025-06-06 PROCEDURE — 99203 OFFICE O/P NEW LOW 30 MIN: CPT | Performed by: NURSE PRACTITIONER

## 2025-06-06 RX ORDER — METOPROLOL SUCCINATE 25 MG/1
25 TABLET, EXTENDED RELEASE ORAL DAILY
COMMUNITY
Start: 2025-03-23

## 2025-06-06 RX ORDER — CIPROFLOXACIN AND DEXAMETHASONE 3; 1 MG/ML; MG/ML
4 SUSPENSION/ DROPS AURICULAR (OTIC) 2 TIMES DAILY
Qty: 7.5 ML | Refills: 0 | Status: SHIPPED | OUTPATIENT
Start: 2025-06-06 | End: 2025-06-13

## 2025-06-06 ASSESSMENT — PAIN SCALES - GENERAL: PAINLEVEL_OUTOF10: 2

## 2025-06-06 ASSESSMENT — PAIN DESCRIPTION - PAIN TYPE: TYPE: ACUTE PAIN

## 2025-06-06 ASSESSMENT — PAIN - FUNCTIONAL ASSESSMENT: PAIN_FUNCTIONAL_ASSESSMENT: 0-10

## 2025-06-06 NOTE — ED PROVIDER NOTES
[facility]  UrgentCare Encounter      CHIEFCOMPLAINT       Chief Complaint   Patient presents with    Ear Pain     L ear- started 1-2 days ago       Nurses Notes reviewed and I agree except as noted in the HPI.  HISTORY OF PRESENT ILLNESS     Natanael Rockwell is a 39 y.o. male who presents to the urgent care for evaluation of left ear pain that started 1-2 days ago.  He is concerned for swimmer's ear.    The patient/patient representative has no other acute complaints at this time.    REVIEW OF SYSTEMS     Review of Systems   HENT:  Positive for ear pain (left).    All other systems reviewed and are negative.      PAST MEDICAL HISTORY         Diagnosis Date    ADHD (attention deficit hyperactivity disorder) 1996    Anxiety     Depression 2000    Obesity 1990's       SURGICAL HISTORY     Patient  has a past surgical history that includes Tonsillectomy (1988); Adenoidectomy (1988); and [place] tooth extraction.    CURRENT MEDICATIONS       Discharge Medication List as of 6/6/2025  4:35 PM        CONTINUE these medications which have NOT CHANGED    Details   metoprolol succinate (TOPROL XL) 25 MG extended release tablet Take 1 tablet by mouth dailyHistorical Med      amphetamine-dextroamphetamine (ADDERALL XR) 20 MG extended release capsule Take 1 capsule by mouth daily for 30 days. Max Daily Amount: 20 mg, Disp-30 capsule, R-0Normal      !! vilazodone HCl (VIIBRYD) 40 MG TABS Take 1 tablet by mouth daily TAKE WITH 10 MG TABLET FOR TOTAL DOSE 50 MG DAILY WITH FOOD, Disp-30 tablet, R-2Normal      !! vilazodone hcl 10 MG TABS Take 1 tablet by mouth daily TAKE WITH 40 MG TABLET FOR TOTAL DOSE 50 MG DAILY WITH FOOD, Disp-30 tablet, R-2Normal      busPIRone (BUSPAR) 10 MG tablet Take 2 tablets by mouth 2 times daily, Disp-120 tablet, R-2Normal      lamoTRIgine (LAMICTAL) 200 MG tablet Take 1 tablet by mouth daily, Disp-30 tablet, R-2Normal      traZODone (DESYREL) 50 MG tablet Take 1 tablet by mouth  not have a family provider please call to establish care, if symptoms change or worsen please go to the nearest emergency room      CHARITO Beck - CNP    Please note that some or all of this chart was generated using [employer] voice recognition software. Although every effort was made to ensure the accuracy of this automated transcription, some errors in transcription may have occurred.         Glendy White, CHARITO - CNP  06/06/25 0947

## 2025-06-06 NOTE — ED NOTES
Pt ambulatory to [facility] with c/o L ear pain for the last couple of days. Pt denies any other symptoms including but not limited to cough, fever, n/v/d, sore throat and congestion. No other concerns noted.      Amalia Campuzano, RN  06/06/25 [id number]

## 2025-06-17 ASSESSMENT — PATIENT HEALTH QUESTIONNAIRE - PHQ9
2. FEELING DOWN, DEPRESSED OR HOPELESS: MORE THAN HALF THE DAYS
8. MOVING OR SPEAKING SO SLOWLY THAT OTHER PEOPLE COULD HAVE NOTICED. OR THE OPPOSITE, BEING SO FIGETY OR RESTLESS THAT YOU HAVE BEEN MOVING AROUND A LOT MORE THAN USUAL: NOT AT ALL
1. LITTLE INTEREST OR PLEASURE IN DOING THINGS: SEVERAL DAYS
1. LITTLE INTEREST OR PLEASURE IN DOING THINGS: SEVERAL DAYS
5. POOR APPETITE OR OVEREATING: NOT AT ALL
7. TROUBLE CONCENTRATING ON THINGS, SUCH AS READING THE NEWSPAPER OR WATCHING TELEVISION: NOT AT ALL
8. MOVING OR SPEAKING SO SLOWLY THAT OTHER PEOPLE COULD HAVE NOTICED. OR THE OPPOSITE - BEING SO FIDGETY OR RESTLESS THAT YOU HAVE BEEN MOVING AROUND A LOT MORE THAN USUAL: NOT AT ALL
SUM OF ALL RESPONSES TO PHQ QUESTIONS 1-9: 8
SUM OF ALL RESPONSES TO PHQ QUESTIONS 1-9: 9
9. THOUGHTS THAT YOU WOULD BE BETTER OFF DEAD, OR OF HURTING YOURSELF: SEVERAL DAYS
3. TROUBLE FALLING OR STAYING ASLEEP: MORE THAN HALF THE DAYS
2. FEELING DOWN, DEPRESSED OR HOPELESS: MORE THAN HALF THE DAYS
10. IF YOU CHECKED OFF ANY PROBLEMS, HOW DIFFICULT HAVE THESE PROBLEMS MADE IT FOR YOU TO DO YOUR WORK, TAKE CARE OF THINGS AT HOME, OR GET ALONG WITH OTHER PEOPLE: VERY DIFFICULT
4. FEELING TIRED OR HAVING LITTLE ENERGY: SEVERAL DAYS
SUM OF ALL RESPONSES TO PHQ QUESTIONS 1-9: 9
SUM OF ALL RESPONSES TO PHQ QUESTIONS 1-9: 9
3. TROUBLE FALLING OR STAYING ASLEEP: MORE THAN HALF THE DAYS
7. TROUBLE CONCENTRATING ON THINGS, SUCH AS READING THE NEWSPAPER OR WATCHING TELEVISION: NOT AT ALL
10. IF YOU CHECKED OFF ANY PROBLEMS, HOW DIFFICULT HAVE THESE PROBLEMS MADE IT FOR YOU TO DO YOUR WORK, TAKE CARE OF THINGS AT HOME, OR GET ALONG WITH OTHER PEOPLE: VERY DIFFICULT
SUM OF ALL RESPONSES TO PHQ QUESTIONS 1-9: 9
6. FEELING BAD ABOUT YOURSELF - OR THAT YOU ARE A FAILURE OR HAVE LET YOURSELF OR YOUR FAMILY DOWN: MORE THAN HALF THE DAYS
5. POOR APPETITE OR OVEREATING: NOT AT ALL
6. FEELING BAD ABOUT YOURSELF - OR THAT YOU ARE A FAILURE OR HAVE LET YOURSELF OR YOUR FAMILY DOWN: MORE THAN HALF THE DAYS
9. THOUGHTS THAT YOU WOULD BE BETTER OFF DEAD, OR OF HURTING YOURSELF: SEVERAL DAYS
4. FEELING TIRED OR HAVING LITTLE ENERGY: SEVERAL DAYS

## 2025-06-17 ASSESSMENT — ANXIETY QUESTIONNAIRES
5. BEING SO RESTLESS THAT IT IS HARD TO SIT STILL: NOT AT ALL
GAD7 TOTAL SCORE: 9
2. NOT BEING ABLE TO STOP OR CONTROL WORRYING: MORE THAN HALF THE DAYS
5. BEING SO RESTLESS THAT IT IS HARD TO SIT STILL: NOT AT ALL
7. FEELING AFRAID AS IF SOMETHING AWFUL MIGHT HAPPEN: SEVERAL DAYS
6. BECOMING EASILY ANNOYED OR IRRITABLE: SEVERAL DAYS
3. WORRYING TOO MUCH ABOUT DIFFERENT THINGS: MORE THAN HALF THE DAYS
1. FEELING NERVOUS, ANXIOUS, OR ON EDGE: MORE THAN HALF THE DAYS
4. TROUBLE RELAXING: SEVERAL DAYS
IF YOU CHECKED OFF ANY PROBLEMS ON THIS QUESTIONNAIRE, HOW DIFFICULT HAVE THESE PROBLEMS MADE IT FOR YOU TO DO YOUR WORK, TAKE CARE OF THINGS AT HOME, OR GET ALONG WITH OTHER PEOPLE: SOMEWHAT DIFFICULT
7. FEELING AFRAID AS IF SOMETHING AWFUL MIGHT HAPPEN: SEVERAL DAYS
6. BECOMING EASILY ANNOYED OR IRRITABLE: SEVERAL DAYS
3. WORRYING TOO MUCH ABOUT DIFFERENT THINGS: MORE THAN HALF THE DAYS
1. FEELING NERVOUS, ANXIOUS, OR ON EDGE: MORE THAN HALF THE DAYS
IF YOU CHECKED OFF ANY PROBLEMS ON THIS QUESTIONNAIRE, HOW DIFFICULT HAVE THESE PROBLEMS MADE IT FOR YOU TO DO YOUR WORK, TAKE CARE OF THINGS AT HOME, OR GET ALONG WITH OTHER PEOPLE: SOMEWHAT DIFFICULT
2. NOT BEING ABLE TO STOP OR CONTROL WORRYING: MORE THAN HALF THE DAYS
4. TROUBLE RELAXING: SEVERAL DAYS

## 2025-06-17 ASSESSMENT — COLUMBIA-SUICIDE SEVERITY RATING SCALE - C-SSRS
1. IN THE PAST MONTH, HAVE YOU WISHED YOU WERE DEAD OR WISHED YOU COULD GO TO SLEEP AND NOT WAKE UP?: YES
6. IN YOUR LIFETIME, HAVE YOU EVER DONE ANYTHING, STARTED TO DO ANYTHING, OR PREPARED TO DO ANYTHING TO END YOUR LIFE?: NO
2. IN THE PAST MONTH, HAVE YOU ACTUALLY HAD ANY THOUGHTS OF KILLING YOURSELF?: NO

## 2025-06-18 ENCOUNTER — TELEMEDICINE (OUTPATIENT)
Dept: PSYCHIATRY | Age: 40
End: 2025-06-18
Payer: COMMERCIAL

## 2025-06-18 DIAGNOSIS — G47.00 INSOMNIA, UNSPECIFIED TYPE: ICD-10-CM

## 2025-06-18 DIAGNOSIS — F90.8 ADHD, ADULT RESIDUAL TYPE: ICD-10-CM

## 2025-06-18 DIAGNOSIS — F39 MOOD DISORDER: ICD-10-CM

## 2025-06-18 DIAGNOSIS — F41.1 GENERALIZED ANXIETY DISORDER: Primary | ICD-10-CM

## 2025-06-18 PROCEDURE — G8427 DOCREV CUR MEDS BY ELIG CLIN: HCPCS | Performed by: PSYCHIATRY & NEUROLOGY

## 2025-06-18 PROCEDURE — 99214 OFFICE O/P EST MOD 30 MIN: CPT | Performed by: PSYCHIATRY & NEUROLOGY

## 2025-06-18 RX ORDER — DEXTROAMPHETAMINE SACCHARATE, AMPHETAMINE ASPARTATE MONOHYDRATE, DEXTROAMPHETAMINE SULFATE AND AMPHETAMINE SULFATE 7.5; 7.5; 7.5; 7.5 MG/1; MG/1; MG/1; MG/1
30 CAPSULE, EXTENDED RELEASE ORAL DAILY
Qty: 30 CAPSULE | Refills: 0 | Status: SHIPPED | OUTPATIENT
Start: 2025-06-18 | End: 2025-07-18

## 2025-06-18 NOTE — PROGRESS NOTES
[facility] [place] [facility]  [facility] PSYCHIATRY  [address] [facility] [address] [phone]    Progress Note    Patient:  Natanael Rockwell  YOB: 1985  PCP:  Antoinette Lira, CHARITO - CNP  Visit Date:  6/18/2025      Chief Complaint   Patient presents with    Follow-up    Medication Check    ADHD    Anxiety    Depression    Insomnia       SUBJECTIVE:      Natanael Rockwell, a 40 y.o. male, presents for a follow up visit.      He presents alone.   Doing \"good\".   Some benefit with Adderall XR.   \"A little\" more energy. A bit more motivation.   Denies side effects mood \"maybe a little irritability\". Of note he had some irritability prior to starting the stimulant.   No CP or HA.  \"A little\" more focused.   Doing okay with Viibryd increase. Mood is a bit better.  Motivation continues to a primary concern along with \"the usual bouts of feeling depressed and hopeless\". Also \"some anxiety. \"Anytime something goes wrong it spikes up.\" Typically a reason. Buspar has helped with the anxiety.   No suicidal intent or plan. No psychosis.   Anxiety more noticeable when due for 2nd dose of Buspar.   Discussed tx options and we agree to increase Adderall XR further for focus with likely mood benefit.       Med Trials: Seroquel (drowsiness, but worsened sleep), Lamictal, Viibryd, Prozac, Ritalin, Adderall, Wellbutrin (irritable)    OBJECTIVE:  Vitals: There were no vitals taken for this visit.    MENTAL STATUS EXAM:    GENERAL  Build: Overweight    Hygiene:  Appropriate   SENSORIUM Orientation: Place, Person, Time, & Situation     Consciousness: Alert    ATTENTION   Focused    RELATEDNESS  Cooperative    EYE CONTACT   Good   PSYCHOMOTOR  Normal   SPEECH Volume: Normal     Rate: Normal rate and tone    Amplitude: Within normal limits   MOOD  \"Maybe a little irritability\"    AFFECT Range: Limited, slight social smile present   THOUGHT Process:  Goal-Directed     Content: no

## 2025-06-23 ASSESSMENT — ANXIETY QUESTIONNAIRES
1. FEELING NERVOUS, ANXIOUS, OR ON EDGE: MORE THAN HALF THE DAYS
7. FEELING AFRAID AS IF SOMETHING AWFUL MIGHT HAPPEN: SEVERAL DAYS
6. BECOMING EASILY ANNOYED OR IRRITABLE: SEVERAL DAYS
2. NOT BEING ABLE TO STOP OR CONTROL WORRYING: SEVERAL DAYS
3. WORRYING TOO MUCH ABOUT DIFFERENT THINGS: SEVERAL DAYS
IF YOU CHECKED OFF ANY PROBLEMS ON THIS QUESTIONNAIRE, HOW DIFFICULT HAVE THESE PROBLEMS MADE IT FOR YOU TO DO YOUR WORK, TAKE CARE OF THINGS AT HOME, OR GET ALONG WITH OTHER PEOPLE: SOMEWHAT DIFFICULT
4. TROUBLE RELAXING: NOT AT ALL
2. NOT BEING ABLE TO STOP OR CONTROL WORRYING: SEVERAL DAYS
4. TROUBLE RELAXING: NOT AT ALL
5. BEING SO RESTLESS THAT IT IS HARD TO SIT STILL: NOT AT ALL
6. BECOMING EASILY ANNOYED OR IRRITABLE: SEVERAL DAYS
1. FEELING NERVOUS, ANXIOUS, OR ON EDGE: MORE THAN HALF THE DAYS
3. WORRYING TOO MUCH ABOUT DIFFERENT THINGS: SEVERAL DAYS
GAD7 TOTAL SCORE: 6
IF YOU CHECKED OFF ANY PROBLEMS ON THIS QUESTIONNAIRE, HOW DIFFICULT HAVE THESE PROBLEMS MADE IT FOR YOU TO DO YOUR WORK, TAKE CARE OF THINGS AT HOME, OR GET ALONG WITH OTHER PEOPLE: SOMEWHAT DIFFICULT
5. BEING SO RESTLESS THAT IT IS HARD TO SIT STILL: NOT AT ALL
7. FEELING AFRAID AS IF SOMETHING AWFUL MIGHT HAPPEN: SEVERAL DAYS

## 2025-06-23 ASSESSMENT — PATIENT HEALTH QUESTIONNAIRE - PHQ9
8. MOVING OR SPEAKING SO SLOWLY THAT OTHER PEOPLE COULD HAVE NOTICED. OR THE OPPOSITE, BEING SO FIGETY OR RESTLESS THAT YOU HAVE BEEN MOVING AROUND A LOT MORE THAN USUAL: NOT AT ALL
10. IF YOU CHECKED OFF ANY PROBLEMS, HOW DIFFICULT HAVE THESE PROBLEMS MADE IT FOR YOU TO DO YOUR WORK, TAKE CARE OF THINGS AT HOME, OR GET ALONG WITH OTHER PEOPLE: VERY DIFFICULT
SUM OF ALL RESPONSES TO PHQ QUESTIONS 1-9: 6
3. TROUBLE FALLING OR STAYING ASLEEP: NOT AT ALL
7. TROUBLE CONCENTRATING ON THINGS, SUCH AS READING THE NEWSPAPER OR WATCHING TELEVISION: NOT AT ALL
8. MOVING OR SPEAKING SO SLOWLY THAT OTHER PEOPLE COULD HAVE NOTICED. OR THE OPPOSITE - BEING SO FIDGETY OR RESTLESS THAT YOU HAVE BEEN MOVING AROUND A LOT MORE THAN USUAL: NOT AT ALL
9. THOUGHTS THAT YOU WOULD BE BETTER OFF DEAD, OR OF HURTING YOURSELF: NOT AT ALL
3. TROUBLE FALLING OR STAYING ASLEEP: NOT AT ALL
9. THOUGHTS THAT YOU WOULD BE BETTER OFF DEAD, OR OF HURTING YOURSELF: NOT AT ALL
SUM OF ALL RESPONSES TO PHQ QUESTIONS 1-9: 6
6. FEELING BAD ABOUT YOURSELF - OR THAT YOU ARE A FAILURE OR HAVE LET YOURSELF OR YOUR FAMILY DOWN: SEVERAL DAYS
5. POOR APPETITE OR OVEREATING: NOT AT ALL
2. FEELING DOWN, DEPRESSED OR HOPELESS: MORE THAN HALF THE DAYS
SUM OF ALL RESPONSES TO PHQ QUESTIONS 1-9: 6
1. LITTLE INTEREST OR PLEASURE IN DOING THINGS: SEVERAL DAYS
10. IF YOU CHECKED OFF ANY PROBLEMS, HOW DIFFICULT HAVE THESE PROBLEMS MADE IT FOR YOU TO DO YOUR WORK, TAKE CARE OF THINGS AT HOME, OR GET ALONG WITH OTHER PEOPLE: VERY DIFFICULT
SUM OF ALL RESPONSES TO PHQ QUESTIONS 1-9: 6
4. FEELING TIRED OR HAVING LITTLE ENERGY: MORE THAN HALF THE DAYS
6. FEELING BAD ABOUT YOURSELF - OR THAT YOU ARE A FAILURE OR HAVE LET YOURSELF OR YOUR FAMILY DOWN: SEVERAL DAYS
1. LITTLE INTEREST OR PLEASURE IN DOING THINGS: SEVERAL DAYS
4. FEELING TIRED OR HAVING LITTLE ENERGY: MORE THAN HALF THE DAYS
7. TROUBLE CONCENTRATING ON THINGS, SUCH AS READING THE NEWSPAPER OR WATCHING TELEVISION: NOT AT ALL
SUM OF ALL RESPONSES TO PHQ QUESTIONS 1-9: 6
2. FEELING DOWN, DEPRESSED OR HOPELESS: MORE THAN HALF THE DAYS
5. POOR APPETITE OR OVEREATING: NOT AT ALL

## 2025-06-26 ENCOUNTER — TELEMEDICINE (OUTPATIENT)
Dept: PSYCHOLOGY | Age: 40
End: 2025-06-26
Payer: COMMERCIAL

## 2025-06-26 DIAGNOSIS — F34.1 DYSTHYMIA: Primary | ICD-10-CM

## 2025-06-26 DIAGNOSIS — F33.1 MAJOR DEPRESSIVE DISORDER, RECURRENT EPISODE, MODERATE WITH ANXIOUS DISTRESS (HCC): ICD-10-CM

## 2025-06-26 PROCEDURE — 90832 PSYTX W PT 30 MINUTES: CPT | Performed by: PSYCHOLOGIST

## 2025-06-26 NOTE — PROGRESS NOTES
Psychotherapy Note  Avery Neely Psy.D.  Visit Date:  6/26/2025    Patient:  Natanael Rockwell  YOB: 1985  Chief Complaint:  Follow-up, Depression, and Stress      Duration of session:  20 minutes      S:     He's planning to take his 's exam in the next month.  He reported some mood benefit from the medication increases from the past couple months.  He mentioned they will be changing their internet provider tomorrow, so it's a great unknown, but exciting.        O:    Appearance    Patient presents as alert, oriented, and cooperative   Appetite abnormal: overeating  Sleep disturbance No  Loss of pleasure Some  Speech    normal rate, normal volume, well articulated and clear and understandable  Mood    Depressed  Affect    depressed affect  Thought Process    goal directed, coherent and linear   Insight    Fair  Judgment    Intact  Memory    recent and remote memory intact  Suicide Assessment    Reported some SI today, but denied plans or intent, was able to verbally contract for safety      A:    1. Dysthymia    2. Major depressive disorder, recurrent episode, moderate with anxious distress (HCC)        Dysthymia seems to be part of his presentation with the MDD episodes coming and going.  Overall, ct is stable.  Will continue to encourage behavioral activation and use supportive counseling.       Overall, ct has maintained his status at times in the pre-contemplative stage of change, then after some prodding moves into the contemplative stage.  Then, when left to his own devices talks himself out of it.  He has a lot of thoughts that reflect failure, or not being able to do anything right.  As much as we've tried CBT and MI techniques, they are strong and unchanging.     P:    MyChart visit in 2-3 mos.    Natanael Rockwell, was evaluated through a synchronous (real-time) audio-video encounter. The patient (or guardian if applicable) is aware that this is a billable service, which includes

## 2025-07-21 DIAGNOSIS — F90.8 ADHD, ADULT RESIDUAL TYPE: ICD-10-CM

## 2025-07-21 RX ORDER — DEXTROAMPHETAMINE SACCHARATE, AMPHETAMINE ASPARTATE MONOHYDRATE, DEXTROAMPHETAMINE SULFATE AND AMPHETAMINE SULFATE 7.5; 7.5; 7.5; 7.5 MG/1; MG/1; MG/1; MG/1
30 CAPSULE, EXTENDED RELEASE ORAL DAILY
Qty: 30 CAPSULE | Refills: 0 | Status: SHIPPED | OUTPATIENT
Start: 2025-07-21 | End: 2025-08-20

## 2025-07-21 NOTE — TELEPHONE ENCOUNTER
Nette (mother) called into the office leaving a VM stating that Natanael needs a refill on his Adderall XR 30mg;#30 with 0 refills; last with a start date of 06/18/25. Reports only having #1 pill left.    Medication is pending your approval for a 30 day supply with 0 refills; he is scheduled to return on 07/30/25. Last seen on 06/18/25.

## 2025-08-06 DIAGNOSIS — F41.1 GENERALIZED ANXIETY DISORDER: ICD-10-CM

## 2025-08-06 DIAGNOSIS — F90.8 ADHD, ADULT RESIDUAL TYPE: ICD-10-CM

## 2025-08-06 RX ORDER — VILAZODONE HYDROCHLORIDE 40 MG/1
40 TABLET ORAL DAILY
Qty: 30 TABLET | Refills: 2 | Status: SHIPPED | OUTPATIENT
Start: 2025-08-06

## 2025-08-06 RX ORDER — VILAZODONE HYDROCHLORIDE 10 MG/1
10 TABLET ORAL DAILY
Qty: 30 TABLET | Refills: 2 | Status: SHIPPED | OUTPATIENT
Start: 2025-08-06

## 2025-08-06 RX ORDER — LAMOTRIGINE 200 MG/1
200 TABLET ORAL DAILY
Qty: 30 TABLET | Refills: 2 | Status: SHIPPED | OUTPATIENT
Start: 2025-08-06

## 2025-08-06 RX ORDER — BUSPIRONE HYDROCHLORIDE 10 MG/1
20 TABLET ORAL 2 TIMES DAILY
Qty: 120 TABLET | Refills: 2 | Status: SHIPPED | OUTPATIENT
Start: 2025-08-06

## 2025-08-06 RX ORDER — DEXTROAMPHETAMINE SACCHARATE, AMPHETAMINE ASPARTATE MONOHYDRATE, DEXTROAMPHETAMINE SULFATE AND AMPHETAMINE SULFATE 7.5; 7.5; 7.5; 7.5 MG/1; MG/1; MG/1; MG/1
30 CAPSULE, EXTENDED RELEASE ORAL DAILY
Qty: 30 CAPSULE | Refills: 0 | Status: SHIPPED | OUTPATIENT
Start: 2025-08-20 | End: 2025-09-19

## 2025-08-24 ASSESSMENT — COLUMBIA-SUICIDE SEVERITY RATING SCALE - C-SSRS
1. IN THE PAST MONTH, HAVE YOU WISHED YOU WERE DEAD OR WISHED YOU COULD GO TO SLEEP AND NOT WAKE UP?: YES
2. IN THE PAST MONTH, HAVE YOU ACTUALLY HAD ANY THOUGHTS OF KILLING YOURSELF?: NO
6. IN YOUR LIFETIME, HAVE YOU EVER DONE ANYTHING, STARTED TO DO ANYTHING, OR PREPARED TO DO ANYTHING TO END YOUR LIFE?: NO

## 2025-08-24 ASSESSMENT — PATIENT HEALTH QUESTIONNAIRE - PHQ9
10. IF YOU CHECKED OFF ANY PROBLEMS, HOW DIFFICULT HAVE THESE PROBLEMS MADE IT FOR YOU TO DO YOUR WORK, TAKE CARE OF THINGS AT HOME, OR GET ALONG WITH OTHER PEOPLE: EXTREMELY DIFFICULT
SUM OF ALL RESPONSES TO PHQ QUESTIONS 1-9: 9
4. FEELING TIRED OR HAVING LITTLE ENERGY: NEARLY EVERY DAY
2. FEELING DOWN, DEPRESSED OR HOPELESS: NEARLY EVERY DAY
4. FEELING TIRED OR HAVING LITTLE ENERGY: NEARLY EVERY DAY
SUM OF ALL RESPONSES TO PHQ QUESTIONS 1-9: 8
6. FEELING BAD ABOUT YOURSELF - OR THAT YOU ARE A FAILURE OR HAVE LET YOURSELF OR YOUR FAMILY DOWN: MORE THAN HALF THE DAYS
8. MOVING OR SPEAKING SO SLOWLY THAT OTHER PEOPLE COULD HAVE NOTICED. OR THE OPPOSITE - BEING SO FIDGETY OR RESTLESS THAT YOU HAVE BEEN MOVING AROUND A LOT MORE THAN USUAL: NOT AT ALL
SUM OF ALL RESPONSES TO PHQ QUESTIONS 1-9: 9
6. FEELING BAD ABOUT YOURSELF - OR THAT YOU ARE A FAILURE OR HAVE LET YOURSELF OR YOUR FAMILY DOWN: MORE THAN HALF THE DAYS
3. TROUBLE FALLING OR STAYING ASLEEP: NOT AT ALL
SUM OF ALL RESPONSES TO PHQ QUESTIONS 1-9: 9
5. POOR APPETITE OR OVEREATING: NOT AT ALL
5. POOR APPETITE OR OVEREATING: NOT AT ALL
7. TROUBLE CONCENTRATING ON THINGS, SUCH AS READING THE NEWSPAPER OR WATCHING TELEVISION: NOT AT ALL
8. MOVING OR SPEAKING SO SLOWLY THAT OTHER PEOPLE COULD HAVE NOTICED. OR THE OPPOSITE, BEING SO FIGETY OR RESTLESS THAT YOU HAVE BEEN MOVING AROUND A LOT MORE THAN USUAL: NOT AT ALL
9. THOUGHTS THAT YOU WOULD BE BETTER OFF DEAD, OR OF HURTING YOURSELF: SEVERAL DAYS
SUM OF ALL RESPONSES TO PHQ QUESTIONS 1-9: 9
10. IF YOU CHECKED OFF ANY PROBLEMS, HOW DIFFICULT HAVE THESE PROBLEMS MADE IT FOR YOU TO DO YOUR WORK, TAKE CARE OF THINGS AT HOME, OR GET ALONG WITH OTHER PEOPLE: EXTREMELY DIFFICULT
2. FEELING DOWN, DEPRESSED OR HOPELESS: NEARLY EVERY DAY
1. LITTLE INTEREST OR PLEASURE IN DOING THINGS: NOT AT ALL
7. TROUBLE CONCENTRATING ON THINGS, SUCH AS READING THE NEWSPAPER OR WATCHING TELEVISION: NOT AT ALL
9. THOUGHTS THAT YOU WOULD BE BETTER OFF DEAD, OR OF HURTING YOURSELF: SEVERAL DAYS
3. TROUBLE FALLING OR STAYING ASLEEP: NOT AT ALL
1. LITTLE INTEREST OR PLEASURE IN DOING THINGS: NOT AT ALL

## 2025-08-24 ASSESSMENT — ANXIETY QUESTIONNAIRES
5. BEING SO RESTLESS THAT IT IS HARD TO SIT STILL: NOT AT ALL
3. WORRYING TOO MUCH ABOUT DIFFERENT THINGS: NEARLY EVERY DAY
3. WORRYING TOO MUCH ABOUT DIFFERENT THINGS: NEARLY EVERY DAY
IF YOU CHECKED OFF ANY PROBLEMS ON THIS QUESTIONNAIRE, HOW DIFFICULT HAVE THESE PROBLEMS MADE IT FOR YOU TO DO YOUR WORK, TAKE CARE OF THINGS AT HOME, OR GET ALONG WITH OTHER PEOPLE: VERY DIFFICULT
6. BECOMING EASILY ANNOYED OR IRRITABLE: NEARLY EVERY DAY
7. FEELING AFRAID AS IF SOMETHING AWFUL MIGHT HAPPEN: MORE THAN HALF THE DAYS
5. BEING SO RESTLESS THAT IT IS HARD TO SIT STILL: NOT AT ALL
1. FEELING NERVOUS, ANXIOUS, OR ON EDGE: NEARLY EVERY DAY
4. TROUBLE RELAXING: NEARLY EVERY DAY
2. NOT BEING ABLE TO STOP OR CONTROL WORRYING: NEARLY EVERY DAY
4. TROUBLE RELAXING: NEARLY EVERY DAY
2. NOT BEING ABLE TO STOP OR CONTROL WORRYING: NEARLY EVERY DAY
IF YOU CHECKED OFF ANY PROBLEMS ON THIS QUESTIONNAIRE, HOW DIFFICULT HAVE THESE PROBLEMS MADE IT FOR YOU TO DO YOUR WORK, TAKE CARE OF THINGS AT HOME, OR GET ALONG WITH OTHER PEOPLE: VERY DIFFICULT
1. FEELING NERVOUS, ANXIOUS, OR ON EDGE: NEARLY EVERY DAY
6. BECOMING EASILY ANNOYED OR IRRITABLE: NEARLY EVERY DAY
GAD7 TOTAL SCORE: 17
7. FEELING AFRAID AS IF SOMETHING AWFUL MIGHT HAPPEN: MORE THAN HALF THE DAYS

## 2025-08-27 ENCOUNTER — TELEMEDICINE (OUTPATIENT)
Dept: PSYCHIATRY | Age: 40
End: 2025-08-27
Payer: COMMERCIAL

## 2025-08-27 DIAGNOSIS — F41.1 GENERALIZED ANXIETY DISORDER: ICD-10-CM

## 2025-08-27 DIAGNOSIS — G47.00 INSOMNIA, UNSPECIFIED TYPE: ICD-10-CM

## 2025-08-27 DIAGNOSIS — F39 MOOD DISORDER: ICD-10-CM

## 2025-08-27 DIAGNOSIS — F90.8 ADHD, ADULT RESIDUAL TYPE: Primary | ICD-10-CM

## 2025-08-27 PROCEDURE — G8427 DOCREV CUR MEDS BY ELIG CLIN: HCPCS | Performed by: PSYCHIATRY & NEUROLOGY

## 2025-08-27 PROCEDURE — 99214 OFFICE O/P EST MOD 30 MIN: CPT | Performed by: PSYCHIATRY & NEUROLOGY

## 2025-08-27 RX ORDER — DEXTROAMPHETAMINE SACCHARATE, AMPHETAMINE ASPARTATE MONOHYDRATE, DEXTROAMPHETAMINE SULFATE AND AMPHETAMINE SULFATE 5; 5; 5; 5 MG/1; MG/1; MG/1; MG/1
40 CAPSULE, EXTENDED RELEASE ORAL EVERY MORNING
Qty: 60 CAPSULE | Refills: 0 | Status: SHIPPED | OUTPATIENT
Start: 2025-08-27 | End: 2025-09-26

## 2025-08-27 RX ORDER — BUSPIRONE HYDROCHLORIDE 30 MG/1
30 TABLET ORAL 2 TIMES DAILY
Qty: 60 TABLET | Refills: 1 | Status: SHIPPED | OUTPATIENT
Start: 2025-08-27

## 2025-08-27 RX ORDER — DOXYCYCLINE 100 MG/1
100 CAPSULE ORAL 2 TIMES DAILY
COMMUNITY
Start: 2025-08-11